# Patient Record
Sex: FEMALE | Race: BLACK OR AFRICAN AMERICAN | Employment: FULL TIME | URBAN - METROPOLITAN AREA
[De-identification: names, ages, dates, MRNs, and addresses within clinical notes are randomized per-mention and may not be internally consistent; named-entity substitution may affect disease eponyms.]

---

## 2017-04-20 ENCOUNTER — OFFICE VISIT (OUTPATIENT)
Dept: INTERNAL MEDICINE | Age: 57
End: 2017-04-20
Payer: COMMERCIAL

## 2017-04-20 VITALS
HEIGHT: 66 IN | WEIGHT: 141.8 LBS | SYSTOLIC BLOOD PRESSURE: 124 MMHG | HEART RATE: 64 BPM | BODY MASS INDEX: 22.79 KG/M2 | DIASTOLIC BLOOD PRESSURE: 77 MMHG

## 2017-04-20 DIAGNOSIS — E78.5 DYSLIPIDEMIA: ICD-10-CM

## 2017-04-20 DIAGNOSIS — E11.8 TYPE 2 DIABETES MELLITUS WITH COMPLICATION, UNSPECIFIED LONG TERM INSULIN USE STATUS: ICD-10-CM

## 2017-04-20 LAB — HBA1C MFR BLD: 6.9 %

## 2017-04-20 PROCEDURE — 99213 OFFICE O/P EST LOW 20 MIN: CPT | Performed by: STUDENT IN AN ORGANIZED HEALTH CARE EDUCATION/TRAINING PROGRAM

## 2017-04-20 PROCEDURE — 83036 HEMOGLOBIN GLYCOSYLATED A1C: CPT | Performed by: STUDENT IN AN ORGANIZED HEALTH CARE EDUCATION/TRAINING PROGRAM

## 2017-04-20 RX ORDER — PRAVASTATIN SODIUM 20 MG
TABLET ORAL
Qty: 30 TABLET | Refills: 3 | Status: SHIPPED | OUTPATIENT
Start: 2017-04-20 | End: 2017-08-28 | Stop reason: SDUPTHER

## 2017-04-20 RX ORDER — GLIMEPIRIDE 2 MG/1
TABLET ORAL
Qty: 30 TABLET | Refills: 3 | Status: SHIPPED | OUTPATIENT
Start: 2017-04-20 | End: 2017-08-28 | Stop reason: SDUPTHER

## 2017-04-20 RX ORDER — LISINOPRIL 2.5 MG/1
TABLET ORAL
Qty: 30 TABLET | Refills: 3 | Status: SHIPPED | OUTPATIENT
Start: 2017-04-20 | End: 2017-08-28 | Stop reason: SDUPTHER

## 2017-04-20 ASSESSMENT — PATIENT HEALTH QUESTIONNAIRE - PHQ9
7. TROUBLE CONCENTRATING ON THINGS, SUCH AS READING THE NEWSPAPER OR WATCHING TELEVISION: 0
5. POOR APPETITE OR OVEREATING: 0
2. FEELING DOWN, DEPRESSED OR HOPELESS: 0
3. TROUBLE FALLING OR STAYING ASLEEP: 0
SUM OF ALL RESPONSES TO PHQ9 QUESTIONS 1 & 2: 0
4. FEELING TIRED OR HAVING LITTLE ENERGY: 0
9. THOUGHTS THAT YOU WOULD BE BETTER OFF DEAD, OR OF HURTING YOURSELF: 0
10. IF YOU CHECKED OFF ANY PROBLEMS, HOW DIFFICULT HAVE THESE PROBLEMS MADE IT FOR YOU TO DO YOUR WORK, TAKE CARE OF THINGS AT HOME, OR GET ALONG WITH OTHER PEOPLE: 0
8. MOVING OR SPEAKING SO SLOWLY THAT OTHER PEOPLE COULD HAVE NOTICED. OR THE OPPOSITE, BEING SO FIGETY OR RESTLESS THAT YOU HAVE BEEN MOVING AROUND A LOT MORE THAN USUAL: 0
1. LITTLE INTEREST OR PLEASURE IN DOING THINGS: 0
6. FEELING BAD ABOUT YOURSELF - OR THAT YOU ARE A FAILURE OR HAVE LET YOURSELF OR YOUR FAMILY DOWN: 0
SUM OF ALL RESPONSES TO PHQ QUESTIONS 1-9: 0

## 2017-06-14 ENCOUNTER — TELEPHONE (OUTPATIENT)
Dept: INTERNAL MEDICINE | Age: 57
End: 2017-06-14

## 2017-06-14 RX ORDER — CEPHALEXIN 500 MG/1
500 CAPSULE ORAL 2 TIMES DAILY
Qty: 14 CAPSULE | Refills: 0 | Status: SHIPPED | OUTPATIENT
Start: 2017-06-14 | End: 2017-06-21

## 2017-08-28 DIAGNOSIS — E78.5 DYSLIPIDEMIA: ICD-10-CM

## 2017-08-28 DIAGNOSIS — E11.8 TYPE 2 DIABETES MELLITUS WITH COMPLICATION, UNSPECIFIED LONG TERM INSULIN USE STATUS: ICD-10-CM

## 2017-08-28 RX ORDER — GLIMEPIRIDE 2 MG/1
TABLET ORAL
Qty: 30 TABLET | Refills: 3 | Status: SHIPPED | OUTPATIENT
Start: 2017-08-28 | End: 2018-02-18 | Stop reason: SDUPTHER

## 2017-08-28 RX ORDER — LISINOPRIL 2.5 MG/1
TABLET ORAL
Qty: 30 TABLET | Refills: 3 | Status: SHIPPED | OUTPATIENT
Start: 2017-08-28 | End: 2018-02-18 | Stop reason: SDUPTHER

## 2017-08-28 RX ORDER — PRAVASTATIN SODIUM 20 MG
TABLET ORAL
Qty: 30 TABLET | Refills: 3 | Status: SHIPPED | OUTPATIENT
Start: 2017-08-28 | End: 2018-02-18 | Stop reason: SDUPTHER

## 2017-11-15 ENCOUNTER — TELEPHONE (OUTPATIENT)
Dept: INTERNAL MEDICINE | Age: 57
End: 2017-11-15

## 2018-02-12 ENCOUNTER — HOSPITAL ENCOUNTER (OUTPATIENT)
Age: 58
Setting detail: SPECIMEN
Discharge: HOME | End: 2018-02-12
Payer: COMMERCIAL

## 2018-02-12 LAB
ANION GAP SERPL CALCULATED.3IONS-SCNC: 11 MMOL/L (ref 9–17)
BUN BLDV-MCNC: 14 MG/DL (ref 6–20)
BUN/CREAT BLD: ABNORMAL (ref 9–20)
CALCIUM SERPL-MCNC: 9.1 MG/DL (ref 8.6–10.4)
CHLORIDE BLD-SCNC: 106 MMOL/L (ref 98–107)
CHOLESTEROL/HDL RATIO: 2.7
CHOLESTEROL: 189 MG/DL
CO2: 25 MMOL/L (ref 20–31)
CREAT SERPL-MCNC: 0.42 MG/DL (ref 0.5–0.9)
CREATININE URINE: 111.8 MG/DL (ref 28–217)
GFR AFRICAN AMERICAN: >60 ML/MIN
GFR NON-AFRICAN AMERICAN: >60 ML/MIN
GFR SERPL CREATININE-BSD FRML MDRD: ABNORMAL ML/MIN/{1.73_M2}
GFR SERPL CREATININE-BSD FRML MDRD: ABNORMAL ML/MIN/{1.73_M2}
GLUCOSE BLD-MCNC: 140 MG/DL (ref 70–99)
HDLC SERPL-MCNC: 70 MG/DL
LDL CHOLESTEROL: 105 MG/DL (ref 0–130)
MICROALBUMIN/CREAT 24H UR: <12 MG/L
MICROALBUMIN/CREAT UR-RTO: NORMAL MCG/MG CREAT
POTASSIUM SERPL-SCNC: 4.1 MMOL/L (ref 3.7–5.3)
SODIUM BLD-SCNC: 142 MMOL/L (ref 135–144)
TRIGL SERPL-MCNC: 68 MG/DL
VLDLC SERPL CALC-MCNC: NORMAL MG/DL (ref 1–30)

## 2018-02-12 PROCEDURE — 80061 LIPID PANEL: CPT

## 2018-02-12 PROCEDURE — 80048 BASIC METABOLIC PNL TOTAL CA: CPT

## 2018-02-12 PROCEDURE — 36415 COLL VENOUS BLD VENIPUNCTURE: CPT

## 2018-02-12 PROCEDURE — 82570 ASSAY OF URINE CREATININE: CPT

## 2018-02-12 PROCEDURE — 82043 UR ALBUMIN QUANTITATIVE: CPT

## 2018-02-18 DIAGNOSIS — E11.8 TYPE 2 DIABETES MELLITUS WITH COMPLICATION, UNSPECIFIED LONG TERM INSULIN USE STATUS: ICD-10-CM

## 2018-02-18 DIAGNOSIS — E78.5 DYSLIPIDEMIA: ICD-10-CM

## 2018-02-19 RX ORDER — PRAVASTATIN SODIUM 20 MG
TABLET ORAL
Qty: 30 TABLET | Refills: 0 | Status: SHIPPED | OUTPATIENT
Start: 2018-02-19 | End: 2018-03-01 | Stop reason: SDUPTHER

## 2018-02-19 RX ORDER — GLIMEPIRIDE 2 MG/1
TABLET ORAL
Qty: 30 TABLET | Refills: 0 | Status: SHIPPED | OUTPATIENT
Start: 2018-02-19 | End: 2018-03-01 | Stop reason: SDUPTHER

## 2018-02-19 RX ORDER — LISINOPRIL 2.5 MG/1
TABLET ORAL
Qty: 30 TABLET | Refills: 0 | Status: SHIPPED | OUTPATIENT
Start: 2018-02-19 | End: 2018-03-01 | Stop reason: SDUPTHER

## 2018-03-01 DIAGNOSIS — E11.8 TYPE 2 DIABETES MELLITUS WITH COMPLICATION, UNSPECIFIED LONG TERM INSULIN USE STATUS: ICD-10-CM

## 2018-03-01 DIAGNOSIS — E78.5 DYSLIPIDEMIA: ICD-10-CM

## 2018-03-01 RX ORDER — LISINOPRIL 2.5 MG/1
2.5 TABLET ORAL DAILY
Qty: 30 TABLET | Refills: 0 | Status: SHIPPED | OUTPATIENT
Start: 2018-03-01 | End: 2018-03-07 | Stop reason: SDUPTHER

## 2018-03-01 RX ORDER — PRAVASTATIN SODIUM 20 MG
20 TABLET ORAL DAILY
Qty: 30 TABLET | Refills: 0 | Status: SHIPPED | OUTPATIENT
Start: 2018-03-01 | End: 2018-03-07 | Stop reason: SDUPTHER

## 2018-03-01 RX ORDER — GLIMEPIRIDE 2 MG/1
2 TABLET ORAL
Qty: 30 TABLET | Refills: 0 | Status: SHIPPED | OUTPATIENT
Start: 2018-03-01 | End: 2018-03-07 | Stop reason: SDUPTHER

## 2018-03-07 ENCOUNTER — OFFICE VISIT (OUTPATIENT)
Dept: INTERNAL MEDICINE | Age: 58
End: 2018-03-07
Payer: COMMERCIAL

## 2018-03-07 VITALS
WEIGHT: 149 LBS | HEART RATE: 76 BPM | HEIGHT: 66 IN | DIASTOLIC BLOOD PRESSURE: 70 MMHG | SYSTOLIC BLOOD PRESSURE: 116 MMHG | BODY MASS INDEX: 23.95 KG/M2

## 2018-03-07 DIAGNOSIS — E11.9 TYPE 2 DIABETES MELLITUS WITHOUT COMPLICATION, WITHOUT LONG-TERM CURRENT USE OF INSULIN (HCC): Primary | ICD-10-CM

## 2018-03-07 DIAGNOSIS — Z00.00 HEALTH CARE MAINTENANCE: ICD-10-CM

## 2018-03-07 DIAGNOSIS — E78.5 DYSLIPIDEMIA: ICD-10-CM

## 2018-03-07 DIAGNOSIS — Z12.31 ENCOUNTER FOR SCREENING MAMMOGRAM FOR BREAST CANCER: ICD-10-CM

## 2018-03-07 DIAGNOSIS — Z23 NEED FOR PROPHYLACTIC VACCINATION AND INOCULATION AGAINST VARICELLA: ICD-10-CM

## 2018-03-07 DIAGNOSIS — E11.8 TYPE 2 DIABETES MELLITUS WITH COMPLICATION, UNSPECIFIED LONG TERM INSULIN USE STATUS: ICD-10-CM

## 2018-03-07 LAB — HBA1C MFR BLD: 7.6 %

## 2018-03-07 PROCEDURE — 99213 OFFICE O/P EST LOW 20 MIN: CPT | Performed by: INTERNAL MEDICINE

## 2018-03-07 PROCEDURE — 83036 HEMOGLOBIN GLYCOSYLATED A1C: CPT | Performed by: INTERNAL MEDICINE

## 2018-03-07 RX ORDER — LISINOPRIL 2.5 MG/1
2.5 TABLET ORAL DAILY
Qty: 90 TABLET | Refills: 1 | Status: SHIPPED | OUTPATIENT
Start: 2018-03-07 | End: 2018-07-25 | Stop reason: SDUPTHER

## 2018-03-07 RX ORDER — PRAVASTATIN SODIUM 20 MG
20 TABLET ORAL DAILY
Qty: 90 TABLET | Refills: 1 | Status: SHIPPED | OUTPATIENT
Start: 2018-03-07 | End: 2018-07-25 | Stop reason: SDUPTHER

## 2018-03-07 RX ORDER — GLIMEPIRIDE 2 MG/1
2 TABLET ORAL
Qty: 90 TABLET | Refills: 1 | Status: SHIPPED | OUTPATIENT
Start: 2018-03-07 | End: 2018-07-25 | Stop reason: SDUPTHER

## 2018-03-07 NOTE — PROGRESS NOTES
speech-recognition program. While intending to generate a document that actually reflects the content of the visit, the document can still have some mistakes which may not have been identified and corrected by editing.

## 2018-03-09 ENCOUNTER — OFFICE VISIT (OUTPATIENT)
Dept: INTERNAL MEDICINE | Age: 58
End: 2018-03-09
Payer: COMMERCIAL

## 2018-03-09 VITALS
DIASTOLIC BLOOD PRESSURE: 71 MMHG | HEART RATE: 76 BPM | WEIGHT: 151 LBS | BODY MASS INDEX: 24.37 KG/M2 | SYSTOLIC BLOOD PRESSURE: 121 MMHG

## 2018-03-09 DIAGNOSIS — M65.341 TRIGGER RING FINGER OF RIGHT HAND: Primary | ICD-10-CM

## 2018-03-09 PROCEDURE — 99213 OFFICE O/P EST LOW 20 MIN: CPT

## 2018-03-09 PROCEDURE — 99213 OFFICE O/P EST LOW 20 MIN: CPT | Performed by: INTERNAL MEDICINE

## 2018-03-09 RX ORDER — ACETAMINOPHEN 500 MG
1000 TABLET ORAL 3 TIMES DAILY PRN
Qty: 60 TABLET | Refills: 0 | Status: SHIPPED | OUTPATIENT
Start: 2018-03-09

## 2018-03-09 NOTE — PROGRESS NOTES
Visit Information    Have you changed or started any medications since your last visit including any over-the-counter medicines, vitamins, or herbal medicines? no   Have you stopped taking any of your medications? Is so, why? -  no  Are you having any side effects from any of your medications? - no    Have you seen any other physician or provider since your last visit?  no   Have you had any other diagnostic tests since your last visit?  no   Have you been seen in the emergency room and/or had an admission in a hospital since we last saw you?  no   Have you had your routine dental cleaning in the past 6 months?  no     Do you have an active MyChart account? If no, what is the barrier?   Yes    Patient Care Team:  Rashawn Escobar MD as PCP - General (Internal Medicine)    Medical History Review  Past Medical, Family, and Social History reviewed and does not contribute to the patient presenting condition    Health Maintenance   Topic Date Due    Hepatitis C screen  1960    HIV screen  07/19/1975    Shingles Vaccine (1 of 2 - 2 Dose Series) 07/19/2010    Diabetic retinal exam  11/01/2016    Flu vaccine (1) 09/01/2017    Breast cancer screen  02/04/2018    Diabetic microalbuminuria test  02/12/2019    Lipid screen  02/12/2019    Potassium monitoring  02/12/2019    Creatinine monitoring  02/12/2019    Diabetic foot exam  03/07/2019    A1C test (Diabetic or Prediabetic)  03/07/2019    DTaP/Tdap/Td vaccine (2 - Td) 02/01/2026    Colon cancer screen colonoscopy  07/05/2026    Pneumococcal med risk  Completed

## 2018-03-09 NOTE — PROGRESS NOTES
[facility]    Progress Note    Date of patient's visit: 3/9/2018  YOB: 1960  Patient's Name:  Guru Rizo    Patient Care Team:  Vasiliy Isaac MD as PCP - General (Internal Medicine)    REASON FOR VISIT: Routine outpatient follow     HISTORY OF PRESENT ILLNESS:    History was obtained from the patient. Guru Rizo is a 62 y.o. is here for a  Sick visit for right ring finger pain and contracture. Patient states that over the past several days she's been doing repetitive lifting of various boxes. She states that the finger on her right hand/ring finger developed pain in the PIP and MCP joints but spared the DIP joint. Patient has no other pain or arthralgia and hands knees elbows hips. Patient states that after trying to flex the finger she was unable to really extend the finger. She states that she has decrease in  strength.     Patient Active Problem List   Diagnosis    Dyslipidemia    Tension headache    Intraocular pressure increase    Retinal detachment of right eye with single break    Type 2 diabetes mellitus without complication, without long-term current use of insulin ([facility])       ALLERGIES      Allergies   Allergen Reactions    Aspirin Hives    Ibuprofen          MEDICATIONS:      Current Outpatient Prescriptions on File Prior to Visit   Medication Sig Dispense Refill    SITagliptin (JANUVIA) 100 MG tablet Take 1 tablet by mouth every morning (before breakfast) 30 tablet 3    lisinopril (PRINIVIL;ZESTRIL) 2.5 MG tablet Take 1 tablet by mouth daily 90 tablet 1    pravastatin (PRAVACHOL) 20 MG tablet Take 1 tablet by mouth daily 90 tablet 1    glimepiride (AMARYL) 2 MG tablet Take 1 tablet by mouth every morning (before breakfast) 90 tablet 1    metFORMIN (GLUCOPHAGE) 500 MG tablet Take 1 tablet by mouth 2 times daily (with meals) 180 tablet 1    linagliptin (TRADJENTA) 5 MG tablet Take 1 tablet by mouth daily 30 tablet 5  brimonidine (ALPHAGAN P) 0.15 % ophthalmic solution Place 1 drop into the right eye 2 times daily  1    timolol (TIMOPTIC) 0.5 % ophthalmic solution Place 1 drop into both eyes 2 times daily  1    bimatoprost (LUMIGAN) 0.01 % SOLN ophthalmic drops Place 1 drop into the right eye daily       No current facility-administered medications on file prior to visit. SOCIAL HISTORY    Reviewed and no change from previous record. Sandee Lubin  reports that she has never smoked. She has never used smokeless tobacco.    FAMILY HISTORY:    Reviewed and No change from previous visit    REVIEW OF SYSTEMS:    ENT: negative  Respiratory: no cough, shortness of breath, or wheezing  Cardiovascular: no chest pain or dyspnea on exertion  Gastrointestinal: no abdominal pain, black or bloody stools  Neurological: no TIA or stroke symptoms  Endocrine: negative  Genito-Urinary: no dysuria, trouble voiding, or hematuria  Musculoskeletal: Right ring finger  Dermatological: negative    PHYSICAL EXAM:      Vitals:    03/09/18 0826   BP: 121/71   Pulse: 76     Chest - clear to auscultation, no wheezes, rales or rhonchi, symmetric air entry  Heart - normal rate, regular rhythm, normal S1, S2, no murmurs, rubs, clicks or gallops  Abdomen - soft, nontender, nondistended, no masses or organomegaly  Neurological - alert, oriented, normal speech, no focal findings or movement disorder noted  Musculoskeletal - 5 out of 5 strength in  strength of right hand. There is no cord formation. No evidence of erythema gross sores or drainage.  Cap Refill was less than 2 seconds  Extremities - peripheral pulses normal, no pedal edema, no clubbing or cyanosis    LABORATORY FINDINGS:    CBC:  Lab Results   Component Value Date    WBC 3.3 06/11/2015    HGB 12.9 06/11/2015     06/11/2015     BMP:    Lab Results   Component Value Date     02/12/2018    K 4.1 02/12/2018     02/12/2018    CO2 25 02/12/2018    BUN 14 02/12/2018    CREATININE

## 2018-03-12 ENCOUNTER — PATIENT MESSAGE (OUTPATIENT)
Dept: INTERNAL MEDICINE | Age: 58
End: 2018-03-12

## 2018-04-04 ENCOUNTER — HOSPITAL ENCOUNTER (OUTPATIENT)
Dept: WOMENS IMAGING | Age: 58
Discharge: HOME | End: 2018-04-06
Payer: COMMERCIAL

## 2018-04-04 DIAGNOSIS — Z12.31 ENCOUNTER FOR SCREENING MAMMOGRAM FOR BREAST CANCER: ICD-10-CM

## 2018-04-04 PROCEDURE — 77063 BREAST TOMOSYNTHESIS BI: CPT

## 2018-05-04 ENCOUNTER — HOSPITAL ENCOUNTER (OUTPATIENT)
Age: 58
Setting detail: SPECIMEN
Discharge: HOME | End: 2018-05-04
Payer: COMMERCIAL

## 2018-05-04 ENCOUNTER — OFFICE VISIT (OUTPATIENT)
Dept: INTERNAL MEDICINE | Age: 58
End: 2018-05-04
Payer: COMMERCIAL

## 2018-05-04 VITALS
HEIGHT: 66 IN | BODY MASS INDEX: 24.75 KG/M2 | WEIGHT: 154 LBS | SYSTOLIC BLOOD PRESSURE: 96 MMHG | DIASTOLIC BLOOD PRESSURE: 67 MMHG | HEART RATE: 74 BPM

## 2018-05-04 DIAGNOSIS — M65.341 TRIGGER RING FINGER OF RIGHT HAND: Primary | ICD-10-CM

## 2018-05-04 DIAGNOSIS — Z00.00 HEALTH CARE MAINTENANCE: ICD-10-CM

## 2018-05-04 LAB
HEPATITIS C ANTIBODY: NONREACTIVE
HIV AG/AB: NONREACTIVE

## 2018-05-04 PROCEDURE — 87389 HIV-1 AG W/HIV-1&-2 AB AG IA: CPT

## 2018-05-04 PROCEDURE — 36415 COLL VENOUS BLD VENIPUNCTURE: CPT

## 2018-05-04 PROCEDURE — 86803 HEPATITIS C AB TEST: CPT

## 2018-05-04 PROCEDURE — 99212 OFFICE O/P EST SF 10 MIN: CPT | Performed by: INTERNAL MEDICINE

## 2018-05-04 ASSESSMENT — PATIENT HEALTH QUESTIONNAIRE - PHQ9
SUM OF ALL RESPONSES TO PHQ QUESTIONS 1-9: 0
1. LITTLE INTEREST OR PLEASURE IN DOING THINGS: 0
SUM OF ALL RESPONSES TO PHQ9 QUESTIONS 1 & 2: 0
2. FEELING DOWN, DEPRESSED OR HOPELESS: 0

## 2018-05-16 ENCOUNTER — OFFICE VISIT (OUTPATIENT)
Dept: ORTHOPEDIC SURGERY | Age: 58
End: 2018-05-16
Payer: COMMERCIAL

## 2018-05-16 VITALS — HEIGHT: 66 IN | BODY MASS INDEX: 24.45 KG/M2 | WEIGHT: 152.12 LBS

## 2018-05-16 DIAGNOSIS — M65.341 TRIGGER FINGER, RIGHT RING FINGER: Primary | ICD-10-CM

## 2018-05-16 PROCEDURE — 99243 OFF/OP CNSLTJ NEW/EST LOW 30: CPT | Performed by: ORTHOPAEDIC SURGERY

## 2018-05-16 PROCEDURE — 20550 NJX 1 TENDON SHEATH/LIGAMENT: CPT | Performed by: ORTHOPAEDIC SURGERY

## 2018-05-16 RX ORDER — BETAMETHASONE SODIUM PHOSPHATE AND BETAMETHASONE ACETATE 3; 3 MG/ML; MG/ML
3 INJECTION, SUSPENSION INTRA-ARTICULAR; INTRALESIONAL; INTRAMUSCULAR; SOFT TISSUE ONCE
Status: COMPLETED | OUTPATIENT
Start: 2018-05-16 | End: 2018-05-16

## 2018-05-16 RX ORDER — LIDOCAINE HYDROCHLORIDE 10 MG/ML
0.5 INJECTION, SOLUTION INFILTRATION; PERINEURAL ONCE
Status: COMPLETED | OUTPATIENT
Start: 2018-05-16 | End: 2018-05-16

## 2018-05-16 RX ADMIN — BETAMETHASONE SODIUM PHOSPHATE AND BETAMETHASONE ACETATE 3 MG: 3; 3 INJECTION, SUSPENSION INTRA-ARTICULAR; INTRALESIONAL; INTRAMUSCULAR; SOFT TISSUE at 13:22

## 2018-05-16 RX ADMIN — LIDOCAINE HYDROCHLORIDE 0.5 ML: 10 INJECTION, SOLUTION INFILTRATION; PERINEURAL at 13:22

## 2018-05-16 ASSESSMENT — ENCOUNTER SYMPTOMS
DIARRHEA: 0
COUGH: 0
CONSTIPATION: 0
NAUSEA: 0

## 2018-06-28 ENCOUNTER — TELEPHONE (OUTPATIENT)
Dept: INTERNAL MEDICINE | Age: 58
End: 2018-06-28

## 2018-06-28 DIAGNOSIS — N39.0 URINARY TRACT INFECTION WITHOUT HEMATURIA, SITE UNSPECIFIED: Primary | ICD-10-CM

## 2018-06-29 RX ORDER — CEPHALEXIN 500 MG/1
500 CAPSULE ORAL 2 TIMES DAILY
Qty: 14 CAPSULE | Refills: 0 | Status: SHIPPED | OUTPATIENT
Start: 2018-06-29 | End: 2018-07-06

## 2018-07-05 RX ORDER — SITAGLIPTIN 100 MG/1
TABLET, FILM COATED ORAL
Qty: 30 TABLET | Refills: 3 | Status: SHIPPED | OUTPATIENT
Start: 2018-07-05 | End: 2018-07-25 | Stop reason: SDUPTHER

## 2018-07-25 ENCOUNTER — HOSPITAL ENCOUNTER (OUTPATIENT)
Age: 58
Setting detail: SPECIMEN
Discharge: HOME | End: 2018-07-25
Payer: COMMERCIAL

## 2018-07-25 ENCOUNTER — OFFICE VISIT (OUTPATIENT)
Dept: INTERNAL MEDICINE | Age: 58
End: 2018-07-25
Payer: COMMERCIAL

## 2018-07-25 VITALS
DIASTOLIC BLOOD PRESSURE: 72 MMHG | WEIGHT: 154 LBS | HEART RATE: 72 BPM | SYSTOLIC BLOOD PRESSURE: 106 MMHG | BODY MASS INDEX: 25.66 KG/M2 | HEIGHT: 65 IN

## 2018-07-25 DIAGNOSIS — E11.9 TYPE 2 DIABETES MELLITUS WITHOUT COMPLICATION, WITHOUT LONG-TERM CURRENT USE OF INSULIN (HCC): Primary | ICD-10-CM

## 2018-07-25 DIAGNOSIS — E11.9 TYPE 2 DIABETES MELLITUS WITHOUT COMPLICATION, WITHOUT LONG-TERM CURRENT USE OF INSULIN (HCC): ICD-10-CM

## 2018-07-25 DIAGNOSIS — E11.8 TYPE 2 DIABETES MELLITUS WITH COMPLICATION, UNSPECIFIED LONG TERM INSULIN USE STATUS: ICD-10-CM

## 2018-07-25 DIAGNOSIS — I10 ESSENTIAL HYPERTENSION: ICD-10-CM

## 2018-07-25 DIAGNOSIS — E78.5 DYSLIPIDEMIA: ICD-10-CM

## 2018-07-25 LAB
ESTIMATED AVERAGE GLUCOSE: 131 MG/DL
HBA1C MFR BLD: 6.2 % (ref 4–6)

## 2018-07-25 PROCEDURE — 83036 HEMOGLOBIN GLYCOSYLATED A1C: CPT

## 2018-07-25 PROCEDURE — 99211 OFF/OP EST MAY X REQ PHY/QHP: CPT | Performed by: INTERNAL MEDICINE

## 2018-07-25 PROCEDURE — 99214 OFFICE O/P EST MOD 30 MIN: CPT | Performed by: INTERNAL MEDICINE

## 2018-07-25 PROCEDURE — 36415 COLL VENOUS BLD VENIPUNCTURE: CPT

## 2018-07-25 RX ORDER — LISINOPRIL 2.5 MG/1
2.5 TABLET ORAL DAILY
Qty: 90 TABLET | Refills: 1 | Status: SHIPPED | OUTPATIENT
Start: 2018-07-25

## 2018-07-25 RX ORDER — PRAVASTATIN SODIUM 20 MG
20 TABLET ORAL DAILY
Qty: 90 TABLET | Refills: 1 | Status: SHIPPED | OUTPATIENT
Start: 2018-07-25

## 2018-07-25 RX ORDER — GLIMEPIRIDE 2 MG/1
2 TABLET ORAL
Qty: 90 TABLET | Refills: 1 | Status: SHIPPED | OUTPATIENT
Start: 2018-07-25

## 2018-07-25 NOTE — PROGRESS NOTES
Chronic Disease Visit Information    BP Readings from Last 3 Encounters:   05/04/18 96/67   03/09/18 121/71   03/07/18 116/70          Hemoglobin A1C (%)   Date Value   03/07/2018 7.6   04/20/2017 6.9   02/01/2016 7.3     Microalb/Crt. Ratio (mcg/mg creat)   Date Value   02/12/2018 CANNOT BE CALCULATED     LDL Cholesterol (mg/dL)   Date Value   02/12/2018 105     HDL (mg/dL)   Date Value   02/12/2018 70     BUN (mg/dL)   Date Value   02/12/2018 14     CREATININE (mg/dL)   Date Value   02/12/2018 0.42 (L)     Glucose (mg/dL)   Date Value   02/12/2018 140 (H)   04/12/2012 132 (H)            Have you changed or started any medications since your last visit including any over-the-counter medicines, vitamins, or herbal medicines? no   Are you having any side effects from any of your medications? -  no  Have you stopped taking any of your medications? Is so, why? -  no    Have you seen any other physician or provider since your last visit? No  Have you had any other diagnostic tests since your last visit? No  Have you been seen in the emergency room and/or had an admission to a hospital since we last saw you? No  Have you had your annual diabetic retinal (eye) exam? No  Have you had your routine dental cleaning in the past 6 months? no    Have you activated your [employer] account? If not, what are your barriers?  Yes     Patient Care Team:  Lillie Guadalupe MD as PCP - General (Internal Medicine)         Medical History Review  Past Medical, Family, and Social History reviewed and does not contribute to the patient presenting condition    Health Maintenance   Topic Date Due    Diabetic retinal exam  11/01/2016    Flu vaccine (1) 09/01/2018    Shingles Vaccine (2 of 2 - 2 Dose Series) 09/09/2018    Diabetic microalbuminuria test  02/12/2019    Lipid screen  02/12/2019    Potassium monitoring  02/12/2019    Creatinine monitoring  02/12/2019    Diabetic foot exam  03/07/2019    A1C test (Diabetic or Prediabetic)

## 2018-07-25 NOTE — PATIENT INSTRUCTIONS
Medications e-scribed to pharmacy of patient's choice. LABORATORY INSTRUCTIONS    Your doctor has ordered blood or urine testing. You can get this testing done at the Lab located on the first floor of the [facility], or at any other [facility]. Please stop at Main Registration, before going to the lab, as you must be registered first.     Please get this lab done before your next visit. You may eat or drink before this test.    Return To Clinic in about 3 months, around 10/25/18, patient will call to schedule around 9/25/18. After Visit Summary mailed to the patient along with appointment card and all other paperwork. JF    It is very important for your care that you keep your appointment. If for some reason you are unable to keep your appointment it is equally important that you call our office at [phone] to cancel your appointment and reschedule. Failure to do so may result in your termination from our practice.

## 2018-07-25 NOTE — PROGRESS NOTES
[facility] PHYSICIANS  [facility] [address] [facility]  [address] [facility] [address] [facility]  Dept: [phone]      Today's Date: 7/25/2018  Patient Name: Jamie James  Patient's age: 62 y.o., 1960        CHIEF COMPLAINT:    Chief Complaint   Patient presents with    Diabetes     3 month follow up   [address] Exam due       History Obtained From:  patient    HISTORY OF PRESENT ILLNESS:      The patient is a 62 y.o. old  female and is here to Follow-up for hypertension and diabetes. Her blood pressure is well controlled with lisinopril 2.5 mg daily. For diabetes she is on metformin 500 mg daily, Januvia 100 mg daily, glimepiride 2 mg daily. She is not able to tolerate metformin will then once a day. She states her blood sugar has been running in low 100s to sometimes and 90s or 80s. Denies any hypoglycemic events. Patient Active Problem List   Diagnosis    Dyslipidemia    Tension headache    Intraocular pressure increase    Retinal detachment of right eye with single break    Type 2 diabetes mellitus without complication, without long-term current use of insulin ([facility])       Past Medical History:   has a past medical history of Diabetic neuropathy ([address] Fibroid (bleeding) (uterine); Glaucoma; Headache(784.0); History of anemia; Hx of blood clots; Hyperlipidemia; Increased intraocular pressure; Retinal detachment; and Type II or unspecified type diabetes mellitus without mention of complication, not stated as uncontrolled. Past Surgical History:   has a past surgical history that includes Wrist ganglion excision (Left, 1968); Hysterectomy, total abdominal; Colonoscopy; Eye surgery (Right); eye surgery (Right, 08/11/2016); and Cataract removal with implant (Right, 12/06/2016).      Medications:    Current Outpatient Prescriptions   Medication Sig Dispense Refill    JANUVIA 100 MG tablet take 1 tablet by mouth every morning BEFORE BREAKFAST 30 tablet 3    acetaminophen (APAP EXTRA STRENGTH) 500 MG tablet Take 2 tablets by mouth 3 times daily as needed for Pain 60 tablet 0    Elastic Bandages & Supports (FINGER SPLINT) MISC Dx: finger pain with contracture 1 each 1    lisinopril (PRINIVIL;ZESTRIL) 2.5 MG tablet Take 1 tablet by mouth daily 90 tablet 1    pravastatin (PRAVACHOL) 20 MG tablet Take 1 tablet by mouth daily 90 tablet 1    glimepiride (AMARYL) 2 MG tablet Take 1 tablet by mouth every morning (before breakfast) 90 tablet 1    metFORMIN (GLUCOPHAGE) 500 MG tablet Take 1 tablet by mouth 2 times daily (with meals) 180 tablet 1    brimonidine (ALPHAGAN P) 0.15 % ophthalmic solution Place 1 drop into the right eye 2 times daily  1    timolol (TIMOPTIC) 0.5 % ophthalmic solution Place 1 drop into both eyes 2 times daily  1    bimatoprost (LUMIGAN) 0.01 % SOLN ophthalmic drops Place 1 drop into the right eye daily       No current facility-administered medications for this visit. Allergies:  Aspirin and Ibuprofen    Social History:   reports that she has never smoked. She has never used smokeless tobacco. She reports that she does not drink alcohol or use drugs. Family History: family history includes Brain Cancer in her maternal uncle; Breast Cancer in her maternal aunt; Colon Cancer in her maternal aunt; Diabetes in her mother; High Blood Pressure in her brother and mother. REVIEW OF SYSTEMS:      Constitutional: Negative for fever and fatigue. HENT: Negative for congestion and sore throat. Eyes: Negative for eye pain and visual disturbance. Respiratory: Negative for chest tightness and shortness of breath. Cardiovascular: Negative for chest pain and orthopnea . Gastrointestinal: Negative for vomiting, abdominal pain, constipation and diarrhea. Endocrine: Negative for cold intolerance, heat intolerance, polydipsia and polyuria. Genitourinary: Negative for dysuria and frequency.    Musculoskeletal: Negative for Myalgia, back pain, bone pain and arthralgias. Neurological: Negative for dizziness, weakness and headaches. PHYSICAL EXAM:        /72 (Site: Left Arm, Position: Sitting, Cuff Size: Medium Adult)   Pulse 72   Ht 5' 5\" (1.651 m)   Wt 154 lb (69.9 kg)   BMI 25.63 kg/m²      General appearance - well appearing, not in  distress. Mental status - alert and cooperative . Head: Normocephalic, without obvious abnormality, atraumatic. Eye: PERRL, conjunctiva/corneas clear, EOM's intact. Neck - Supple, no significant adenopathy . Chest - Clear to auscultation, no wheezes, rales or rhonchi, symmetric air entry. Heart -  regular rhythm, normal S1, S2, no murmurs. Abdomen - Soft, nontender, nondistended, no masses or organomegaly. Neurological - Alert, oriented, normal speech, no focal findings or movement disorder noted. .   Extremities -  No pedal edema, no clubbing or cyanosis. Labs:       Chemistry        Component Value Date/Time     02/12/2018 0857    K 4.1 02/12/2018 0857     02/12/2018 0857    CO2 25 02/12/2018 0857    BUN 14 02/12/2018 0857    CREATININE 0.42 (L) 02/12/2018 0857        Component Value Date/Time    CALCIUM 9.1 02/12/2018 0857    ALKPHOS 103 06/11/2015 0857    AST 15 06/11/2015 0857    ALT 19 06/11/2015 0857    BILITOT 0.37 06/11/2015 0857          No results for input(s): GLUCOSE in the last 72 hours.   Lab Results   Component Value Date    WBC 3.3 (L) 06/11/2015    HGB 12.9 06/11/2015    HCT 39.0 06/11/2015    MCV 89.8 06/11/2015     06/11/2015     No results found for: TSH  Lab Results   Component Value Date    LABA1C 7.6 03/07/2018     Lab Results   Component Value Date    LABMICR CANNOT BE CALCULATED 02/12/2018     No components found for: CHLPL  Lab Results   Component Value Date    TRIG 68 02/12/2018     Lab Results   Component Value Date    HDL 70 02/12/2018     Lab Results   Component Value Date    LDLCHOLESTEROL 105 02/12/2018     The 10-year ASCVD risk score (Scar Pete et al., 2013) is: 4.5%    Values used to calculate the score:      Age: 62 years      Sex: Female      Is Non- : Yes      Diabetic: Yes      Tobacco smoker: No      Systolic Blood Pressure: 854 mmHg      Is BP treated: No      HDL Cholesterol: 70 mg/dL      Total Cholesterol: 189 mg/dL    Health Maintenance Due   Topic Date Due    Diabetic retinal exam  11/01/2016        ASSESSMENT AND PLAN    1. Type 2 diabetes mellitus without complication, without long-term current use of insulin (HCC)    - Hemoglobin A1C; Future  - SITagliptin (JANUVIA) 100 MG tablet; take 1 tablet by mouth every morning BEFORE BREAKFAST  Dispense: 90 tablet; Refill: 1  - metFORMIN (GLUCOPHAGE) 500 MG tablet; Take 1 tablet by mouth 2 times daily (with meals)  Dispense: 180 tablet; Refill: 1    2. Type 2 diabetes mellitus with complication, unspecified long term insulin use status (HCC)    - glimepiride (AMARYL) 2 MG tablet; Take 1 tablet by mouth every morning (before breakfast)  Dispense: 90 tablet; Refill: 1    3. Dyslipidemia    - pravastatin (PRAVACHOL) 20 MG tablet; Take 1 tablet by mouth daily  Dispense: 90 tablet; Refill: 1    4. Essential hypertension    - lisinopril (PRINIVIL;ZESTRIL) 2.5 MG tablet; Take 1 tablet by mouth daily  Dispense: 90 tablet; Refill: 1      · Continue current meds   · A1c   · Sydnee received counseling on the following healthy behaviors: exercise and medication adherence  · Discussed use, benefit, and side effects of prescribed medications. Barriers to medication compliance addressed. All patient questions answered. Pt voiced understanding.    · The return visit should be in 3 months      Angela Reynolds MD  Attending and Faculty Internal Medicine  [facility] [address] [facility]  [address] [facility] [address] [facility]  Dept: [phone]  7/25/18      This note is created with the assistance of a speech-recognition program. While intending to generate a document that actually reflects the content of the visit, the document can still have some mistakes which may not have been identified and corrected by editing.

## 2019-07-09 ENCOUNTER — PATIENT MESSAGE (OUTPATIENT)
Dept: OTHER | Facility: CLINIC | Age: 59
End: 2019-07-09

## 2023-03-07 ENCOUNTER — OFFICE VISIT (OUTPATIENT)
Dept: INTERNAL MEDICINE CLINIC | Age: 63
End: 2023-03-07

## 2023-03-07 VITALS
DIASTOLIC BLOOD PRESSURE: 78 MMHG | HEIGHT: 66 IN | BODY MASS INDEX: 24.3 KG/M2 | WEIGHT: 151.2 LBS | SYSTOLIC BLOOD PRESSURE: 118 MMHG

## 2023-03-07 DIAGNOSIS — Z00.00 WELL ADULT EXAM: ICD-10-CM

## 2023-03-07 DIAGNOSIS — E11.42 TYPE 2 DIABETES MELLITUS WITH DIABETIC POLYNEUROPATHY, WITHOUT LONG-TERM CURRENT USE OF INSULIN (HCC): Primary | ICD-10-CM

## 2023-03-07 DIAGNOSIS — E78.5 DYSLIPIDEMIA: ICD-10-CM

## 2023-03-07 DIAGNOSIS — I10 ESSENTIAL HYPERTENSION: ICD-10-CM

## 2023-03-07 DIAGNOSIS — Z13.220 SCREENING CHOLESTEROL LEVEL: ICD-10-CM

## 2023-03-07 DIAGNOSIS — E11.9 TYPE 2 DIABETES MELLITUS WITHOUT COMPLICATION, WITHOUT LONG-TERM CURRENT USE OF INSULIN (HCC): ICD-10-CM

## 2023-03-07 DIAGNOSIS — E11.8 TYPE 2 DIABETES MELLITUS WITH COMPLICATION (HCC): ICD-10-CM

## 2023-03-07 DIAGNOSIS — E78.00 HYPERCHOLESTEREMIA: ICD-10-CM

## 2023-03-07 RX ORDER — PRAVASTATIN SODIUM 20 MG
20 TABLET ORAL DAILY
Qty: 90 TABLET | Refills: 1 | Status: SHIPPED | OUTPATIENT
Start: 2023-03-07

## 2023-03-07 RX ORDER — GLUCOSAMINE HCL/CHONDROITIN SU 500-400 MG
CAPSULE ORAL
Qty: 100 STRIP | Refills: 5 | Status: SHIPPED | OUTPATIENT
Start: 2023-03-07

## 2023-03-07 RX ORDER — GLIMEPIRIDE 2 MG/1
2 TABLET ORAL
Qty: 90 TABLET | Refills: 1 | Status: SHIPPED | OUTPATIENT
Start: 2023-03-07

## 2023-03-07 RX ORDER — LISINOPRIL 2.5 MG/1
2.5 TABLET ORAL DAILY
Qty: 90 TABLET | Refills: 1 | Status: SHIPPED | OUTPATIENT
Start: 2023-03-07

## 2023-03-07 RX ORDER — LANCETS 30 GAUGE
EACH MISCELLANEOUS
Qty: 100 EACH | Refills: 3 | Status: SHIPPED | OUTPATIENT
Start: 2023-03-07

## 2023-03-07 RX ORDER — SYRING-NEEDL,DISP,INSUL,0.3 ML 30 GX5/16"
SYRINGE, EMPTY DISPOSABLE MISCELLANEOUS
Qty: 1 EACH | Refills: 0 | Status: SHIPPED | OUTPATIENT
Start: 2023-03-07

## 2023-03-07 RX ORDER — UBIQUINOL 100 MG
CAPSULE ORAL
Qty: 100 EACH | Refills: 3 | Status: SHIPPED | OUTPATIENT
Start: 2023-03-07

## 2023-03-07 RX ORDER — BLOOD-GLUCOSE METER
1 KIT MISCELLANEOUS DAILY
Qty: 1 KIT | Refills: 0 | Status: SHIPPED | OUTPATIENT
Start: 2023-03-07

## 2023-03-07 SDOH — ECONOMIC STABILITY: FOOD INSECURITY: WITHIN THE PAST 12 MONTHS, YOU WORRIED THAT YOUR FOOD WOULD RUN OUT BEFORE YOU GOT MONEY TO BUY MORE.: NEVER TRUE

## 2023-03-07 SDOH — ECONOMIC STABILITY: HOUSING INSECURITY
IN THE LAST 12 MONTHS, WAS THERE A TIME WHEN YOU DID NOT HAVE A STEADY PLACE TO SLEEP OR SLEPT IN A SHELTER (INCLUDING NOW)?: NO

## 2023-03-07 SDOH — ECONOMIC STABILITY: FOOD INSECURITY: WITHIN THE PAST 12 MONTHS, THE FOOD YOU BOUGHT JUST DIDN'T LAST AND YOU DIDN'T HAVE MONEY TO GET MORE.: NEVER TRUE

## 2023-03-07 SDOH — ECONOMIC STABILITY: INCOME INSECURITY: HOW HARD IS IT FOR YOU TO PAY FOR THE VERY BASICS LIKE FOOD, HOUSING, MEDICAL CARE, AND HEATING?: NOT HARD AT ALL

## 2023-03-07 SDOH — HEALTH STABILITY: PHYSICAL HEALTH: ON AVERAGE, HOW MANY DAYS PER WEEK DO YOU ENGAGE IN MODERATE TO STRENUOUS EXERCISE (LIKE A BRISK WALK)?: 6 DAYS

## 2023-03-07 SDOH — HEALTH STABILITY: PHYSICAL HEALTH: ON AVERAGE, HOW MANY MINUTES DO YOU ENGAGE IN EXERCISE AT THIS LEVEL?: 120 MIN

## 2023-03-07 ASSESSMENT — PATIENT HEALTH QUESTIONNAIRE - PHQ9
1. LITTLE INTEREST OR PLEASURE IN DOING THINGS: 0
SUM OF ALL RESPONSES TO PHQ QUESTIONS 1-9: 0
SUM OF ALL RESPONSES TO PHQ QUESTIONS 1-9: 0
2. FEELING DOWN, DEPRESSED OR HOPELESS: 0
SUM OF ALL RESPONSES TO PHQ QUESTIONS 1-9: 0
SUM OF ALL RESPONSES TO PHQ QUESTIONS 1-9: 0
SUM OF ALL RESPONSES TO PHQ9 QUESTIONS 1 & 2: 0

## 2023-03-07 ASSESSMENT — ENCOUNTER SYMPTOMS
GASTROINTESTINAL NEGATIVE: 1
EYES NEGATIVE: 1
RESPIRATORY NEGATIVE: 1

## 2023-03-07 NOTE — PROGRESS NOTES
Visit Information    Have you changed or started any medications since your last visit including any over-the-counter medicines, vitamins, or herbal medicines? no   Are you having any side effects from any of your medications? -  no  Have you stopped taking any of your medications? Is so, why? -  no    Have you seen any other physician or provider since your last visit? No  Have you had any other diagnostic tests since your last visit? No  Have you been seen in the emergency room and/or had an admission to a hospital since we last saw you? No  Have you had your routine dental cleaning in the past 6 months? no    Have you activated your [employer] account? If not, what are your barriers?  Yes     Patient Care Team:  Jeffrey Andersen MD as PCP - General (Internal Medicine)    Medical History Review  Past Medical, Family, and Social History reviewed and does not contribute to the patient presenting condition    Health Maintenance   Topic Date Due    COVID-19 Vaccine (1) Never done    Depression Screen  Never done    Diabetic retinal exam  11/01/2016    Shingles vaccine (2 of 2) 05/04/2018    Diabetic Alb to Cr ratio (uACR) test  02/12/2019    Lipids  02/12/2019    GFR test (Diabetes, CKD 3-4, OR last GFR 15-59)  02/12/2019    Diabetic foot exam  03/07/2019    A1C test (Diabetic or Prediabetic)  07/25/2019    Breast cancer screen  04/04/2020    Flu vaccine (1) Never done    DTaP/Tdap/Td vaccine (2 - Td or Tdap) 02/01/2026    Colorectal Cancer Screen  07/05/2026    Pneumococcal 0-64 years Vaccine  Completed    Hepatitis C screen  Completed    HIV screen  Completed    Hepatitis A vaccine  Aged Out    Hib vaccine  Aged Out    Meningococcal (ACWY) vaccine  Aged Out

## 2023-03-07 NOTE — PROGRESS NOTES
[address] [address]  Dept: [phone]  Dept Fax: [phone]    Lilian Vasquez is a 58 y.o. female who presents today for her medicalconditions/complaints as noted below. Lilian Vasquez is c/o of Annual Exam (New patient )      HPI:     Diabetes  She presents for her initial diabetic visit. She has type 2 diabetes mellitus. Diabetic complications include peripheral neuropathy. Current diabetic treatment includes oral agent (triple therapy). She is compliant with treatment most of the time. An ACE inhibitor/angiotensin II receptor blocker is being taken. Hypertension  This is a chronic problem. The current episode started more than 1 year ago. The problem is unchanged. The problem is controlled. Risk factors for coronary artery disease include diabetes mellitus and dyslipidemia. Past treatments include ACE inhibitors. The current treatment provides significant improvement. Hyperlipidemia  This is a chronic problem. The current episode started more than 1 year ago. The problem is controlled. Current antihyperlipidemic treatment includes statins. The current treatment provides moderate improvement of lipids. Risk factors for coronary artery disease include diabetes mellitus, dyslipidemia and hypertension.      Past Medical History:   Diagnosis Date    Diabetic neuropathy ([facility] [address]     hands feet    Fibroid (bleeding) (uterine)     Glaucoma     right    Headache(784.0)     History of anemia     Hx of blood clots 2005    left leg, states caused from uterine fibroid tumor    Hyperlipidemia     Increased intraocular pressure 2008    right eye, needs to follow with Ophthalmology regularly    Retinal detachment     RIGHT    Type II or unspecified type diabetes mellitus without mention of complication, not stated as uncontrolled         Current Outpatient Medications   Medication Sig Dispense Refill    glucose monitoring (FREESTYLE FREEDOM) kit 1 kit by Does not apply route daily 1 kit 0    blood glucose monitor strips Test__1_times daily Diagnosis: 250.0   Diabetes Mellitus____Insulin Dependent__x__Non-Insulin Dependent 100 strip 5    Lancet Device MISC Use__1__times daily Diagnosis:250.0   Diabetes Mellitus_____Insulin Dependent___x__Non-Insulin Dependent 1 each 0    Lancets MISC Use daily. 100 each 3    Alcohol Swabs (ALCOHOL PREP) 70 % PADS Use___1__times per day Diagnosis: 250.0   Diabetes ___Insulin-Dependent__x_Non-Insulin Dependent 100 each 3    SITagliptin (JANUVIA) 100 MG tablet take 1 tablet by mouth every morning BEFORE BREAKFAST 90 tablet 1    glimepiride (AMARYL) 2 MG tablet Take 1 tablet by mouth every morning (before breakfast) 90 tablet 1    lisinopril (PRINIVIL;ZESTRIL) 2.5 MG tablet Take 1 tablet by mouth daily 90 tablet 1    metFORMIN (GLUCOPHAGE) 500 MG tablet Take 1 tablet by mouth 2 times daily (with meals) 180 tablet 1    pravastatin (PRAVACHOL) 20 MG tablet Take 1 tablet by mouth daily 90 tablet 1    acetaminophen (APAP EXTRA STRENGTH) 500 MG tablet Take 2 tablets by mouth 3 times daily as needed for Pain 60 tablet 0     No current facility-administered medications for this visit.      Allergies   Allergen Reactions    Aspirin Hives    Ibuprofen        Health Maintenance   Topic Date Due    Depression Screen  Never done    Diabetic retinal exam  11/01/2016    Diabetic Alb to Cr ratio (uACR) test  02/12/2019    Lipids  02/12/2019    GFR test (Diabetes, CKD 3-4, OR last GFR 15-59)  02/12/2019    Diabetic foot exam  03/07/2019    A1C test (Diabetic or Prediabetic)  07/25/2019    Breast cancer screen  04/04/2020    COVID-19 Vaccine (4 - Booster for Moderna series) 03/21/2022    Flu vaccine (1) Never done    DTaP/Tdap/Td vaccine (2 - Td or Tdap) 02/01/2026    Colorectal Cancer Screen  07/05/2026    Shingles vaccine  Completed    Pneumococcal 0-64 years Vaccine  Completed    Hepatitis C screen  Completed    HIV screen  Completed    Hepatitis A vaccine Aged Out    Hib vaccine  Aged Out    Meningococcal (ACWY) vaccine  Aged Out       Subjective:      Review of Systems   Constitutional: Negative.    HENT: Negative.     Eyes: Negative.    Respiratory: Negative.     Cardiovascular: Negative.    Gastrointestinal: Negative.    Genitourinary: Negative.    Musculoskeletal: Negative.    Skin: Negative.    Neurological: Negative.    Psychiatric/Behavioral: Negative.     All other systems reviewed and are negative.    Objective:     Physical Exam  Vitals reviewed.   Constitutional:       Appearance: Normal appearance. She is well-developed.   HENT:      Head: Normocephalic and atraumatic.   Eyes:      Conjunctiva/sclera: Conjunctivae normal.      Pupils: Pupils are equal, round, and reactive to light.   Neck:      Thyroid: No thyromegaly.      Vascular: No JVD.   Cardiovascular:      Rate and Rhythm: Normal rate and regular rhythm.      Heart sounds: S1 normal and S2 normal. No murmur heard.  Pulmonary:      Effort: No respiratory distress.      Breath sounds: Normal breath sounds.   Abdominal:      General: Bowel sounds are normal.      Palpations: Abdomen is soft. There is no mass.      Tenderness: There is no abdominal tenderness.   Musculoskeletal:         General: No tenderness. Normal range of motion.      Cervical back: Neck supple.   Lymphadenopathy:      Cervical: No cervical adenopathy.   Skin:     General: Skin is warm and dry.   Neurological:      Mental Status: She is alert and oriented to person, place, and time.      Cranial Nerves: No cranial nerve deficit.      Deep Tendon Reflexes: Reflexes are normal and symmetric.   Psychiatric:         Behavior: Behavior normal.     /78 (Site: Right Upper Arm)   Ht 5' 6\" (1.676 m)   Wt 151 lb 3.2 oz (68.6 kg)   BMI 24.40 kg/m²       Assessment:       Diagnosis Orders   1. Type 2 diabetes mellitus with diabetic polyneuropathy, without long-term current use of insulin ([facility])  glucose monitoring (FREESTYLE FREEDOM)  kit    blood glucose monitor strips    Lancet Device MISC    Lancets MISC    Alcohol Swabs (ALCOHOL PREP) 70 % PADS    Hemoglobin A1C      2. Hypercholesteremia        3. Screening cholesterol level  Lipid Panel      4. Well adult exam  Comprehensive Metabolic Panel    CBC with Auto Differential    TSH with Reflex      5. Type 2 diabetes mellitus without complication, without long-term current use of insulin (HCC)  SITagliptin (JANUVIA) 100 MG tablet    metFORMIN (GLUCOPHAGE) 500 MG tablet      6. Type 2 diabetes mellitus with complication (HCC)  glimepiride (AMARYL) 2 MG tablet      7. Essential hypertension  lisinopril (PRINIVIL;ZESTRIL) 2.5 MG tablet      8. Dyslipidemia  pravastatin (PRAVACHOL) 20 MG tablet          Plan:      No follow-ups on file. Orders Placed This Encounter   Medications    glucose monitoring (FREESTYLE InsideView) kit     Si kit by Does not apply route daily     Dispense:  1 kit     Refill:  0    blood glucose monitor strips     Sig: Test__1_times daily Diagnosis: 250.0   Diabetes Mellitus____Insulin Dependent__x__Non-Insulin Dependent     Dispense:  100 strip     Refill:  5    Lancet Device MISC     Sig: Use__1__times daily Diagnosis:250.0   Diabetes Mellitus_____Insulin Dependent___x__Non-Insulin Dependent     Dispense:  1 each     Refill:  0    Lancets MISC     Sig: Use daily.      Dispense:  100 each     Refill:  3    Alcohol Swabs (ALCOHOL PREP) 70 % PADS     Sig: Use___1__times per day Diagnosis: 250.0   Diabetes ___Insulin-Dependent__x_Non-Insulin Dependent     Dispense:  100 each     Refill:  3    SITagliptin (JANUVIA) 100 MG tablet     Sig: take 1 tablet by mouth every morning BEFORE BREAKFAST     Dispense:  90 tablet     Refill:  1    glimepiride (AMARYL) 2 MG tablet     Sig: Take 1 tablet by mouth every morning (before breakfast)     Dispense:  90 tablet     Refill:  1    lisinopril (PRINIVIL;ZESTRIL) 2.5 MG tablet     Sig: Take 1 tablet by mouth daily     Dispense:  90 tablet Refill:  1    metFORMIN (GLUCOPHAGE) 500 MG tablet     Sig: Take 1 tablet by mouth 2 times daily (with meals)     Dispense:  180 tablet     Refill:  1    pravastatin (PRAVACHOL) 20 MG tablet     Sig: Take 1 tablet by mouth daily     Dispense:  90 tablet     Refill:  1     Orders Placed This Encounter   Procedures    Comprehensive Metabolic Panel     Standing Status:   Future     Standing Expiration Date:   3/7/2024    CBC with Auto Differential     Standing Status:   Future     Standing Expiration Date:   3/6/2024    Lipid Panel     Standing Status:   Future     Standing Expiration Date:   3/7/2024    TSH with Reflex     Standing Status:   Future     Standing Expiration Date:   3/7/2024    Hemoglobin A1C     Standing Status:   Future     Standing Expiration Date:   3/7/2024            Patient given educational materials - see patient instructions. Discussed use, benefit, and side effects of prescribed medications. All patientquestions answered. Pt voiced understanding.     Electronically signed by Agnieszka Davison MD on 3/7/2023at 3:25 PM

## 2023-03-22 ENCOUNTER — HOSPITAL ENCOUNTER (OUTPATIENT)
Age: 63
Setting detail: SPECIMEN
Discharge: HOME | End: 2023-03-22

## 2023-03-22 DIAGNOSIS — Z13.220 SCREENING CHOLESTEROL LEVEL: ICD-10-CM

## 2023-03-22 DIAGNOSIS — Z00.00 WELL ADULT EXAM: ICD-10-CM

## 2023-03-22 DIAGNOSIS — E11.42 TYPE 2 DIABETES MELLITUS WITH DIABETIC POLYNEUROPATHY, WITHOUT LONG-TERM CURRENT USE OF INSULIN (HCC): ICD-10-CM

## 2023-03-22 LAB
ABSOLUTE EOS #: 0.1 K/UL (ref 0–0.44)
ABSOLUTE IMMATURE GRANULOCYTE: <0.03 K/UL (ref 0–0.3)
ABSOLUTE LYMPH #: 2.08 K/UL (ref 1.1–3.7)
ABSOLUTE MONO #: 0.35 K/UL (ref 0.1–1.2)
ALBUMIN SERPL-MCNC: 4 G/DL (ref 3.5–5.2)
ALBUMIN/GLOBULIN RATIO: 1.4 (ref 1–2.5)
ALP SERPL-CCNC: 102 U/L (ref 35–104)
ALT SERPL-CCNC: 19 U/L (ref 5–33)
ANION GAP SERPL CALCULATED.3IONS-SCNC: 12 MMOL/L (ref 9–17)
AST SERPL-CCNC: 20 U/L
BASOPHILS # BLD: 1 % (ref 0–2)
BASOPHILS ABSOLUTE: 0.04 K/UL (ref 0–0.2)
BILIRUB SERPL-MCNC: 0.3 MG/DL (ref 0.3–1.2)
BUN SERPL-MCNC: 19 MG/DL (ref 8–23)
CALCIUM SERPL-MCNC: 9.7 MG/DL (ref 8.6–10.4)
CHLORIDE SERPL-SCNC: 108 MMOL/L (ref 98–107)
CHOLEST SERPL-MCNC: 132 MG/DL
CHOLESTEROL/HDL RATIO: 2.4
CO2 SERPL-SCNC: 24 MMOL/L (ref 20–31)
CREAT SERPL-MCNC: 0.65 MG/DL (ref 0.5–0.9)
EOSINOPHILS RELATIVE PERCENT: 2 % (ref 1–4)
GFR SERPL CREATININE-BSD FRML MDRD: >60 ML/MIN/1.73M2
GLUCOSE SERPL-MCNC: 100 MG/DL (ref 70–99)
HCT VFR BLD AUTO: 42.2 % (ref 36.3–47.1)
HDLC SERPL-MCNC: 54 MG/DL
HGB BLD-MCNC: 13.1 G/DL (ref 11.9–15.1)
IMMATURE GRANULOCYTES: 0 %
LDLC SERPL CALC-MCNC: 67 MG/DL (ref 0–130)
LYMPHOCYTES # BLD: 45 % (ref 24–43)
MCH RBC QN AUTO: 30 PG (ref 25.2–33.5)
MCHC RBC AUTO-ENTMCNC: 31 G/DL (ref 28.4–34.8)
MCV RBC AUTO: 96.8 FL (ref 82.6–102.9)
MONOCYTES # BLD: 8 % (ref 3–12)
NRBC AUTOMATED: 0 PER 100 WBC
PDW BLD-RTO: 14.3 % (ref 11.8–14.4)
PLATELET # BLD AUTO: 340 K/UL (ref 138–453)
PMV BLD AUTO: 10.9 FL (ref 8.1–13.5)
POTASSIUM SERPL-SCNC: 5 MMOL/L (ref 3.7–5.3)
PROT SERPL-MCNC: 6.9 G/DL (ref 6.4–8.3)
RBC # BLD: 4.36 M/UL (ref 3.95–5.11)
SEG NEUTROPHILS: 44 % (ref 36–65)
SEGMENTED NEUTROPHILS ABSOLUTE COUNT: 2 K/UL (ref 1.5–8.1)
SODIUM SERPL-SCNC: 144 MMOL/L (ref 135–144)
TRIGL SERPL-MCNC: 56 MG/DL
TSH SERPL-ACNC: 0.6 UIU/ML (ref 0.3–5)
WBC # BLD AUTO: 4.6 K/UL (ref 3.5–11.3)

## 2023-03-23 LAB
EST. AVERAGE GLUCOSE BLD GHB EST-MCNC: 186 MG/DL
HBA1C MFR BLD: 8.1 % (ref 4–6)

## 2023-04-10 NOTE — TELEPHONE ENCOUNTER
E-scribe request for GLIMEPIRIDE 2 MG TABLET, LISINOPRIL 2.5 MG TABLET, and PRAVASTATIN SODIUM 20 MG TAB. Please review and e-scribe if applicable. Last Visit Date:  4/20/17  Next Visit Date:  3/7/2018    Hemoglobin A1C (%)   Date Value   04/20/2017 6.9   02/01/2016 7.3   06/04/2015 6.9             ( goal A1C is < 7)   Microalb/Crt.  Ratio (mcg/mg creat)   Date Value   02/12/2018 CANNOT BE CALCULATED     LDL Cholesterol (mg/dL)   Date Value   02/12/2018 105       (goal LDL is <100)   AST (U/L)   Date Value   06/11/2015 15     ALT (U/L)   Date Value   06/11/2015 19     BUN (mg/dL)   Date Value   02/12/2018 14     BP Readings from Last 3 Encounters:   04/20/17 124/77   12/06/16 110/67   08/11/16 120/55          (goal 120/80)        Patient Active Problem List:     Diabetes mellitus ([facility] [address]     Dyslipidemia     Tension headache     Intraocular pressure increase     Retinal detachment of right eye with single break      ----JF
Labor Progress Note  Patient seen, fetal heart rate and contraction pattern evaluated, patient examined. Patient Vitals for the past 1 hrs:   BP Temp Pulse Resp SpO2   20 1538 -- -- -- -- 100 %   20 1533 -- -- -- -- 100 %   20 1530 (!) 129/101 -- (!) 112 -- --   20 1528 -- -- -- -- 100 %   20 1523 116/57 98.1 °F (36.7 °C) 94 16 100 %   20 1519 (!) 188/147 -- (!) 292 -- --   20 1518 -- -- -- -- 100 %   20 1517 (!) 171/135 -- (!) 102 -- --   20 1513 -- -- -- -- 100 %   20 1508 -- -- -- -- 100 %   20 1503 -- -- -- -- 99 %   20 1500 119/73 -- 88 -- --   20 1458 -- -- -- -- 100 %       Physical Exam:  Cervical Exam:  5/80 %/-2/   Membranes:  Artificial Rupture of Membranes; Amniotic Fluid: medium amount of clear fluid  Uterine Activity: Frequency: Every 2-5 minutes and Duration: 60 seconds  250-380 MVU in 10 mins  Fetal Heart Rate: Baseline: 130 per minute  Variability: moderate  Accelerations: no  Decelerations: variable and early  FSE in place    Assessment: IUP 37w2d; FHR Category II; IOL for CHTN  Plan: Close monitoring of maternal and fetal wellbeing, maternal repositioning, pitocin stopped and amnio infusion ordered, anticipate . Dr. Kenyatta Escobedo updated.   Kaye Rome CNM  2020  3:57 PM
negative

## 2023-07-20 ENCOUNTER — OFFICE VISIT (OUTPATIENT)
Dept: INTERNAL MEDICINE CLINIC | Age: 63
End: 2023-07-20
Payer: COMMERCIAL

## 2023-07-20 VITALS
DIASTOLIC BLOOD PRESSURE: 62 MMHG | SYSTOLIC BLOOD PRESSURE: 126 MMHG | BODY MASS INDEX: 23.73 KG/M2 | HEART RATE: 91 BPM | OXYGEN SATURATION: 98 % | WEIGHT: 147 LBS

## 2023-07-20 DIAGNOSIS — I10 ESSENTIAL HYPERTENSION: ICD-10-CM

## 2023-07-20 DIAGNOSIS — E11.42 TYPE 2 DIABETES MELLITUS WITH DIABETIC POLYNEUROPATHY, WITHOUT LONG-TERM CURRENT USE OF INSULIN (HCC): Primary | ICD-10-CM

## 2023-07-20 DIAGNOSIS — Z12.31 ENCOUNTER FOR SCREENING MAMMOGRAM FOR BREAST CANCER: ICD-10-CM

## 2023-07-20 PROCEDURE — 3052F HG A1C>EQUAL 8.0%<EQUAL 9.0%: CPT | Performed by: INTERNAL MEDICINE

## 2023-07-20 PROCEDURE — 99213 OFFICE O/P EST LOW 20 MIN: CPT | Performed by: INTERNAL MEDICINE

## 2023-07-20 PROCEDURE — 3074F SYST BP LT 130 MM HG: CPT | Performed by: INTERNAL MEDICINE

## 2023-07-20 PROCEDURE — 3078F DIAST BP <80 MM HG: CPT | Performed by: INTERNAL MEDICINE

## 2023-07-20 ASSESSMENT — ENCOUNTER SYMPTOMS
GASTROINTESTINAL NEGATIVE: 1
EYES NEGATIVE: 1
RESPIRATORY NEGATIVE: 1

## 2023-07-20 NOTE — PROGRESS NOTES
[address] [address]  Dept: [phone]  Dept Fax: [phone]    Pepe Jett is a 61 y.o. female who presents today for her medicalconditions/complaints as noted below. Pepe Jett is c/o of Diabetes      HPI:     Diabetes  She presents for her follow-up diabetic visit. She has type 2 diabetes mellitus. There are no hypoglycemic complications. There are no diabetic complications. Risk factors for coronary artery disease include diabetes mellitus and hypertension. Current diabetic treatment includes oral agent (triple therapy). Hypertension  This is a new problem. The current episode started more than 1 year ago. The problem is unchanged. The problem is controlled. Risk factors for coronary artery disease include diabetes mellitus. Past treatments include ACE inhibitors. The current treatment provides significant improvement. Past Medical History:   Diagnosis Date    Diabetic neuropathy ([address])     hands feet    Fibroid (bleeding) (uterine)     Glaucoma     right    Headache(784.0)     History of anemia     Hx of blood clots 2005    left leg, states caused from uterine fibroid tumor    Hyperlipidemia     Increased intraocular pressure 2008    right eye, needs to follow with Ophthalmology regularly    Retinal detachment     RIGHT    Type II or unspecified type diabetes mellitus without mention of complication, not stated as uncontrolled         Current Outpatient Medications   Medication Sig Dispense Refill    glucose monitoring (FREESTYLE FREEDOM) kit 1 kit by Does not apply route daily 1 kit 0    blood glucose monitor strips Test__1_times daily Diagnosis: 250.0   Diabetes Mellitus____Insulin Dependent__x__Non-Insulin Dependent 100 strip 5    Lancet Device MISC Use__1__times daily Diagnosis:250.0   Diabetes Mellitus_____Insulin Dependent___x__Non-Insulin Dependent 1 each 0    Lancets MISC Use daily.  100 each 3    Alcohol Swabs (ALCOHOL PREP)

## 2023-08-21 ENCOUNTER — HOSPITAL ENCOUNTER (OUTPATIENT)
Dept: WOMENS IMAGING | Age: 63
Discharge: HOME | End: 2023-08-23
Payer: COMMERCIAL

## 2023-08-21 DIAGNOSIS — Z12.31 ENCOUNTER FOR SCREENING MAMMOGRAM FOR BREAST CANCER: ICD-10-CM

## 2023-08-21 PROCEDURE — 77063 BREAST TOMOSYNTHESIS BI: CPT

## 2023-09-12 DIAGNOSIS — E11.9 TYPE 2 DIABETES MELLITUS WITHOUT COMPLICATION, WITHOUT LONG-TERM CURRENT USE OF INSULIN (HCC): ICD-10-CM

## 2023-09-17 DIAGNOSIS — I10 ESSENTIAL HYPERTENSION: ICD-10-CM

## 2023-09-17 DIAGNOSIS — E78.5 DYSLIPIDEMIA: ICD-10-CM

## 2023-09-18 RX ORDER — PRAVASTATIN SODIUM 20 MG
20 TABLET ORAL DAILY
Qty: 180 TABLET | OUTPATIENT
Start: 2023-09-18

## 2023-09-18 RX ORDER — LISINOPRIL 2.5 MG/1
2.5 TABLET ORAL DAILY
Qty: 180 TABLET | OUTPATIENT
Start: 2023-09-18

## 2023-09-27 DIAGNOSIS — I10 ESSENTIAL HYPERTENSION: ICD-10-CM

## 2023-09-27 DIAGNOSIS — E78.5 DYSLIPIDEMIA: ICD-10-CM

## 2023-09-27 DIAGNOSIS — E11.42 TYPE 2 DIABETES MELLITUS WITH DIABETIC POLYNEUROPATHY, WITHOUT LONG-TERM CURRENT USE OF INSULIN (HCC): ICD-10-CM

## 2023-09-27 RX ORDER — LISINOPRIL 2.5 MG/1
2.5 TABLET ORAL DAILY
Qty: 90 TABLET | Refills: 1 | OUTPATIENT
Start: 2023-09-27

## 2023-09-27 RX ORDER — PRAVASTATIN SODIUM 20 MG
20 TABLET ORAL DAILY
Qty: 90 TABLET | Refills: 1 | OUTPATIENT
Start: 2023-09-27

## 2023-09-28 RX ORDER — SYRING-NEEDL,DISP,INSUL,0.3 ML 30 GX5/16"
SYRINGE, EMPTY DISPOSABLE MISCELLANEOUS
Qty: 1 EACH | Refills: 0 | Status: SHIPPED | OUTPATIENT
Start: 2023-09-28

## 2023-09-28 RX ORDER — BLOOD-GLUCOSE METER
1 KIT MISCELLANEOUS DAILY
Qty: 1 KIT | Refills: 0 | OUTPATIENT
Start: 2023-09-28

## 2023-10-02 DIAGNOSIS — E11.9 TYPE 2 DIABETES MELLITUS WITHOUT COMPLICATION, WITHOUT LONG-TERM CURRENT USE OF INSULIN (HCC): ICD-10-CM

## 2023-10-03 DIAGNOSIS — E11.42 TYPE 2 DIABETES MELLITUS WITH DIABETIC POLYNEUROPATHY, WITHOUT LONG-TERM CURRENT USE OF INSULIN (HCC): ICD-10-CM

## 2023-10-03 DIAGNOSIS — E11.9 TYPE 2 DIABETES MELLITUS WITHOUT COMPLICATION, WITHOUT LONG-TERM CURRENT USE OF INSULIN (HCC): ICD-10-CM

## 2023-10-03 DIAGNOSIS — E78.5 DYSLIPIDEMIA: ICD-10-CM

## 2023-10-03 DIAGNOSIS — I10 ESSENTIAL HYPERTENSION: ICD-10-CM

## 2023-10-03 RX ORDER — GLUCOSAMINE HCL/CHONDROITIN SU 500-400 MG
CAPSULE ORAL
Qty: 100 STRIP | Refills: 5 | OUTPATIENT
Start: 2023-10-03

## 2023-10-03 RX ORDER — LISINOPRIL 2.5 MG/1
2.5 TABLET ORAL DAILY
Qty: 90 TABLET | Refills: 1 | OUTPATIENT
Start: 2023-10-03

## 2023-10-03 RX ORDER — LANCETS 30 GAUGE
EACH MISCELLANEOUS
Qty: 100 EACH | Refills: 3 | OUTPATIENT
Start: 2023-10-03

## 2023-10-03 RX ORDER — PRAVASTATIN SODIUM 20 MG
20 TABLET ORAL DAILY
Qty: 90 TABLET | Refills: 1 | OUTPATIENT
Start: 2023-10-03

## 2023-10-03 NOTE — TELEPHONE ENCOUNTER
LV 7/20/2023  NV 10/10/2023 New to Provider    Patient is going out of town until the 9th and is out of medication.

## 2023-10-04 RX ORDER — GLUCOSAMINE HCL/CHONDROITIN SU 500-400 MG
CAPSULE ORAL
Qty: 100 STRIP | Refills: 5 | Status: SHIPPED | OUTPATIENT
Start: 2023-10-04

## 2023-10-04 RX ORDER — PRAVASTATIN SODIUM 20 MG
20 TABLET ORAL DAILY
Qty: 90 TABLET | Refills: 1 | Status: SHIPPED | OUTPATIENT
Start: 2023-10-04

## 2023-10-04 RX ORDER — LANCETS 30 GAUGE
EACH MISCELLANEOUS
Qty: 100 EACH | Refills: 3 | Status: SHIPPED | OUTPATIENT
Start: 2023-10-04

## 2023-10-04 RX ORDER — LISINOPRIL 2.5 MG/1
2.5 TABLET ORAL DAILY
Qty: 90 TABLET | Refills: 1 | Status: SHIPPED | OUTPATIENT
Start: 2023-10-04

## 2023-10-10 ENCOUNTER — HOSPITAL ENCOUNTER (OUTPATIENT)
Age: 63
Setting detail: SPECIMEN
Discharge: HOME | End: 2023-10-10

## 2023-10-10 ENCOUNTER — OFFICE VISIT (OUTPATIENT)
Dept: INTERNAL MEDICINE CLINIC | Age: 63
End: 2023-10-10
Payer: COMMERCIAL

## 2023-10-10 VITALS
WEIGHT: 150.2 LBS | HEART RATE: 69 BPM | SYSTOLIC BLOOD PRESSURE: 124 MMHG | DIASTOLIC BLOOD PRESSURE: 78 MMHG | BODY MASS INDEX: 24.24 KG/M2 | OXYGEN SATURATION: 98 %

## 2023-10-10 DIAGNOSIS — L91.8 CUTANEOUS SKIN TAGS: ICD-10-CM

## 2023-10-10 DIAGNOSIS — E11.42 TYPE 2 DIABETES MELLITUS WITH DIABETIC POLYNEUROPATHY, WITHOUT LONG-TERM CURRENT USE OF INSULIN (HCC): ICD-10-CM

## 2023-10-10 DIAGNOSIS — E11.9 TYPE 2 DIABETES MELLITUS WITHOUT COMPLICATION, WITHOUT LONG-TERM CURRENT USE OF INSULIN (HCC): Primary | ICD-10-CM

## 2023-10-10 LAB
CREAT UR-MCNC: 120.5 MG/DL (ref 28–217)
EST. AVERAGE GLUCOSE BLD GHB EST-MCNC: 120 MG/DL
HBA1C MFR BLD: 5.8 % (ref 4–6)
MICROALBUMIN UR-MCNC: 15 MG/L
MICROALBUMIN/CREAT UR-RTO: 12 MCG/MG CREAT

## 2023-10-10 PROCEDURE — 3052F HG A1C>EQUAL 8.0%<EQUAL 9.0%: CPT | Performed by: INTERNAL MEDICINE

## 2023-10-10 PROCEDURE — 99214 OFFICE O/P EST MOD 30 MIN: CPT | Performed by: INTERNAL MEDICINE

## 2023-10-10 RX ORDER — METFORMIN HYDROCHLORIDE 500 MG/1
500 TABLET, EXTENDED RELEASE ORAL 2 TIMES DAILY
Qty: 180 TABLET | Refills: 1 | Status: SHIPPED | OUTPATIENT
Start: 2023-10-10

## 2023-10-10 SDOH — HEALTH STABILITY: PHYSICAL HEALTH: ON AVERAGE, HOW MANY MINUTES DO YOU ENGAGE IN EXERCISE AT THIS LEVEL?: 60 MIN

## 2023-10-10 SDOH — HEALTH STABILITY: PHYSICAL HEALTH: ON AVERAGE, HOW MANY DAYS PER WEEK DO YOU ENGAGE IN MODERATE TO STRENUOUS EXERCISE (LIKE A BRISK WALK)?: 6 DAYS

## 2023-10-10 NOTE — PROGRESS NOTES
2.7 02/12/2018    CHOLHDLRATIO 2.9 06/11/2015           REVIEW OF SYSTEMS (except Subjective (HPI))  GENERAL: No fevers / chills  RESPIRATORY: Negative for cough, wheezing or shortness of breath  CARDIOVASCULAR: Negative for chest pain or palpitations. GI: no nausea, vomiting, or diarrhea  NEURO: No history of headaches    Past Medical History:   Diagnosis Date    Diabetic neuropathy (HCC)     hands feet    Fibroid (bleeding) (uterine)     Glaucoma     right    Headache(784.0)     History of anemia     Hx of blood clots 2005    left leg, states caused from uterine fibroid tumor    Hyperlipidemia     Increased intraocular pressure 2008    right eye, needs to follow with Ophthalmology regularly    Retinal detachment     RIGHT    Type II or unspecified type diabetes mellitus without mention of complication, not stated as uncontrolled        SOCIAL HISTORY:  Social History     Socioeconomic History    Marital status:      Spouse name: Not on file    Number of children: Not on file    Years of education: Not on file    Highest education level: Not on file   Occupational History    Not on file   Tobacco Use    Smoking status: Never    Smokeless tobacco: Never    Tobacco comments:     never   Substance and Sexual Activity    Alcohol use: No     Alcohol/week: 0.0 standard drinks of alcohol    Drug use: No    Sexual activity: Never   Other Topics Concern    Not on file   Social History Narrative    Not on file     Social Determinants of Health     Financial Resource Strain: Low Risk  (3/7/2023)    Overall Financial Resource Strain (CARDIA)     Difficulty of Paying Living Expenses: Not hard at all   Food Insecurity: No Food Insecurity (3/7/2023)    Hunger Vital Sign     Worried About Running Out of Food in the Last Year: Never true     Ran Out of Food in the Last Year: Never true   Transportation Needs: Unknown (3/7/2023)    PRAPARE - Transportation     Lack of Transportation (Medical):  Not on file     Lack of

## 2023-10-11 ENCOUNTER — TELEPHONE (OUTPATIENT)
Dept: INTERNAL MEDICINE CLINIC | Age: 63
End: 2023-10-11

## 2023-10-11 DIAGNOSIS — E11.69 TYPE 2 DIABETES MELLITUS WITH OTHER SPECIFIED COMPLICATION, WITHOUT LONG-TERM CURRENT USE OF INSULIN (HCC): Primary | ICD-10-CM

## 2023-10-11 RX ORDER — GLIMEPIRIDE 1 MG/1
1 TABLET ORAL
Qty: 90 TABLET | Refills: 1 | Status: SHIPPED | OUTPATIENT
Start: 2023-10-11

## 2023-10-11 NOTE — TELEPHONE ENCOUNTER
Diabetes Control is excellent, hemoglobin A1c is 5.8. We can decrease dosing of glimepiride to 1 mg daily.     Continue current dietary and exercise regimen

## 2023-11-20 ENCOUNTER — HOSPITAL ENCOUNTER (OUTPATIENT)
Dept: PREADMISSION TESTING | Age: 63
Discharge: HOME | End: 2023-11-20

## 2023-11-22 ENCOUNTER — HOSPITAL ENCOUNTER (OUTPATIENT)
Dept: PREADMISSION TESTING | Age: 63
End: 2023-11-22
Payer: COMMERCIAL

## 2023-11-22 VITALS — BODY MASS INDEX: 24.11 KG/M2 | HEIGHT: 66 IN | WEIGHT: 150 LBS

## 2023-11-22 NOTE — PRE-PROCEDURE INSTRUCTIONS
No answer, left message ? yes                           Unable to leave message ? When were you told to arrive at hospital ?      Do you have a  ? Are you on any blood thinners ? If yes when did you stop taking ? Do you have your prep Rx filled and instruction ? Nothing to eat the day before , only clear liquids. Are you experiencing any covid symptoms ? Do you have any infections or rash we should be aware of ? Do you have the Hibiclens soap to use the night before and the morning of surgery ? Nothing to eat or drink after midnight, only a sip of water to take any medication instructed to take the night before.   Wear comfortable clothing, leave any valuables at home, remove any jewelry and body piercing   Reviewed arrival time to the hospital, she does not require a  as this is a local.

## 2023-11-22 NOTE — PROGRESS NOTES
Pre-op Instructions For Out-Patient Surgery    Medication Instructions:  Please stop herbs and any supplements now (includes vitamins and minerals). Please contact your surgeon and prescribing physician for pre-op instructions for any blood thinners. If you have inhalers/aerosol treatments at home, please use them the morning of your surgery and bring the inhalers with you to the hospital.    Please take the following medications the morning of your surgery with a sip of water: All morning meds, patient is not receiving anesthesia, it is a local    Surgery Instructions:    Please shower or bathe before surgery. Please do not wear any cologne, lotion, powder, jewelry, piercings, perfume, makeup, nail polish, hair accessories, or hair spray on the day of surgery. Wear loose comfortable clothing. Leave your valuables at home but bring a payment source for any after-surgery prescriptions you plan to fill at [facility]. Bring a storage case for any glasses/contacts. The Day of Surgery:  Arrive at [facility] AT [facility] Surgery Entrance at the time directed by your surgeon and check in at the desk. If you have a living will or healthcare power of , please bring a copy. You will be taken to the pre-op holding area where you will be prepared for surgery. A physical assessment will be performed by a nurse practitioner or house officer. Your IV will be started and you will meet your anesthesiologist.    When you go to surgery, your family will be directed to the surgical waiting room, where the doctor should speak with them after your surgery. After surgery, you will be taken to the recovery area. When you are alert and stable, you will receive instructions and be prepared for discharge. Instructions given to [facility], understanding verbalized and no questions at this time.

## 2023-11-24 ENCOUNTER — HOSPITAL ENCOUNTER (OUTPATIENT)
Age: 63
Setting detail: OUTPATIENT SURGERY
Discharge: HOME | End: 2023-11-24
Attending: SURGERY | Admitting: SURGERY
Payer: COMMERCIAL

## 2023-11-24 VITALS
HEIGHT: 66 IN | BODY MASS INDEX: 24.11 KG/M2 | WEIGHT: 150 LBS | HEART RATE: 66 BPM | DIASTOLIC BLOOD PRESSURE: 66 MMHG | RESPIRATION RATE: 16 BRPM | OXYGEN SATURATION: 98 % | TEMPERATURE: 98.2 F | SYSTOLIC BLOOD PRESSURE: 136 MMHG

## 2023-11-24 DIAGNOSIS — L98.9 SKIN LESION: ICD-10-CM

## 2023-11-24 LAB — GLUCOSE BLD-MCNC: 85 MG/DL (ref 65–105)

## 2023-11-24 PROCEDURE — 3600000002 HC SURGERY LEVEL 2 BASE: Performed by: SURGERY

## 2023-11-24 PROCEDURE — 3600000012 HC SURGERY LEVEL 2 ADDTL 15MIN: Performed by: SURGERY

## 2023-11-24 PROCEDURE — 2709999900 HC NON-CHARGEABLE SUPPLY: Performed by: SURGERY

## 2023-11-24 PROCEDURE — 6370000000 HC RX 637 (ALT 250 FOR IP): Performed by: SURGERY

## 2023-11-24 PROCEDURE — 2500000003 HC RX 250 WO HCPCS: Performed by: SURGERY

## 2023-11-24 PROCEDURE — 88305 TISSUE EXAM BY PATHOLOGIST: CPT

## 2023-11-24 PROCEDURE — 7100000011 HC PHASE II RECOVERY - ADDTL 15 MIN: Performed by: SURGERY

## 2023-11-24 PROCEDURE — 82947 ASSAY GLUCOSE BLOOD QUANT: CPT

## 2023-11-24 PROCEDURE — 7100000010 HC PHASE II RECOVERY - FIRST 15 MIN: Performed by: SURGERY

## 2023-11-24 RX ORDER — CEPHALEXIN 500 MG/1
CAPSULE ORAL
Qty: 21 CAPSULE | Refills: 0 | Status: SHIPPED | OUTPATIENT
Start: 2023-11-24

## 2023-11-24 RX ORDER — LIDOCAINE HYDROCHLORIDE 10 MG/ML
INJECTION, SOLUTION INFILTRATION; PERINEURAL PRN
Status: DISCONTINUED | OUTPATIENT
Start: 2023-11-24 | End: 2023-11-24 | Stop reason: ALTCHOICE

## 2023-11-24 RX ORDER — GINSENG 100 MG
CAPSULE ORAL PRN
Status: DISCONTINUED | OUTPATIENT
Start: 2023-11-24 | End: 2023-11-24 | Stop reason: ALTCHOICE

## 2023-11-24 ASSESSMENT — PAIN - FUNCTIONAL ASSESSMENT: PAIN_FUNCTIONAL_ASSESSMENT: 0-10

## 2023-11-24 NOTE — OP NOTE
Patient: Mabel Vaughan  YOB: 1960  MRN: [id number]    Date of Procedure: 11/24/2023        Preoperative diagnosis: Skin lesion right inner medial thigh    Postoperative diagnosis: Same    Procedure: Excision skin lesion right inner medial thigh 2 cm    Surgeon: Dr. Aj Bernabe    First assistant: Rosina Aj CNP    Anesthesia: Local    Preparation: Chloraprep    EBL: Less than 10 mL    Specimen: Skin lesion    Procedure: Informed consent was obtained. Site was marked and confirmed. Patient was taken to the operating room. She was lying in a supine position. Operative site was prepped and draped in usual sterile fashion. Timeout was done. Local anesthetic was infiltrated. Approximately 2 cm skin lesion was excised using the Bovie cautery on the right inner medial thigh. Specimen was submitted to pathology. Wound was explored. Hemostasis was confirmed. Antibiotic ointment was applied. Clean dressing was applied. Patient tolerated procedure well and was transferred to the recovery room in a stable condition. First assistant help with instrumentation and exposure and dressing.  -  Recommendations: Local wound care discussed. Outpatient follow-up in the office discussed. Prescription of Keflex called in.

## 2023-11-24 NOTE — PROGRESS NOTES
First Assist Operative Note      Patient: Joss Almanza  YOB: 1960  MRN: [id number]    Date of Procedure: 11/24/2023    Pre-Op Diagnosis Codes: Skin lesion right inner medial thigh    Post-Op Diagnosis: Same       Procedure(s): Excision skin lesion right inner medial thigh 2 cm    Surgeon(s):  Som Sky MD    Assistant: JAMIR Jefferson CNP was needed throughout the entirety of case for help with instrumentation and exposure and dressing. Anesthesia: Local    Detailed Description of Procedure: See Dr. Christopher Flores operative note from 11/24/2023 for further detail.     Electronically signed by JAMIR Jefferson CNP on 11/24/2023 at 1:17 PM
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
## 2023-11-27 LAB — DERMATOLOGY PATHOLOGY REPORT: NORMAL

## 2024-03-18 DIAGNOSIS — E11.9 TYPE 2 DIABETES MELLITUS WITHOUT COMPLICATION, WITHOUT LONG-TERM CURRENT USE OF INSULIN (HCC): ICD-10-CM

## 2024-04-05 DIAGNOSIS — I10 ESSENTIAL HYPERTENSION: ICD-10-CM

## 2024-04-05 RX ORDER — LISINOPRIL 2.5 MG/1
2.5 TABLET ORAL DAILY
Qty: 90 TABLET | Refills: 1 | Status: SHIPPED | OUTPATIENT
Start: 2024-04-05

## 2024-04-06 DIAGNOSIS — E78.5 DYSLIPIDEMIA: ICD-10-CM

## 2024-04-08 RX ORDER — PRAVASTATIN SODIUM 20 MG
20 TABLET ORAL DAILY
Qty: 90 TABLET | Refills: 1 | Status: SHIPPED | OUTPATIENT
Start: 2024-04-08

## 2024-04-08 RX ORDER — GLIMEPIRIDE 1 MG/1
1 TABLET ORAL
Qty: 90 TABLET | Refills: 1 | Status: SHIPPED | OUTPATIENT
Start: 2024-04-08

## 2024-04-09 DIAGNOSIS — E11.9 TYPE 2 DIABETES MELLITUS WITHOUT COMPLICATION, WITHOUT LONG-TERM CURRENT USE OF INSULIN (HCC): ICD-10-CM

## 2024-04-09 RX ORDER — METFORMIN HYDROCHLORIDE 500 MG/1
TABLET, EXTENDED RELEASE ORAL
Qty: 180 TABLET | Refills: 1 | OUTPATIENT
Start: 2024-04-09

## 2024-05-23 DIAGNOSIS — I10 ESSENTIAL HYPERTENSION: ICD-10-CM

## 2024-05-23 RX ORDER — LISINOPRIL 2.5 MG/1
2.5 TABLET ORAL DAILY
Qty: 90 TABLET | Refills: 1 | OUTPATIENT
Start: 2024-05-23

## 2024-06-02 ASSESSMENT — PATIENT HEALTH QUESTIONNAIRE - PHQ9
2. FEELING DOWN, DEPRESSED OR HOPELESS: SEVERAL DAYS
1. LITTLE INTEREST OR PLEASURE IN DOING THINGS: SEVERAL DAYS
1. LITTLE INTEREST OR PLEASURE IN DOING THINGS: SEVERAL DAYS
SUM OF ALL RESPONSES TO PHQ9 QUESTIONS 1 & 2: 2
SUM OF ALL RESPONSES TO PHQ9 QUESTIONS 1 & 2: 2
SUM OF ALL RESPONSES TO PHQ QUESTIONS 1-9: 2
2. FEELING DOWN, DEPRESSED OR HOPELESS: SEVERAL DAYS
SUM OF ALL RESPONSES TO PHQ QUESTIONS 1-9: 2

## 2024-06-04 SDOH — ECONOMIC STABILITY: FOOD INSECURITY: WITHIN THE PAST 12 MONTHS, THE FOOD YOU BOUGHT JUST DIDN'T LAST AND YOU DIDN'T HAVE MONEY TO GET MORE.: NEVER TRUE

## 2024-06-04 SDOH — ECONOMIC STABILITY: FOOD INSECURITY: WITHIN THE PAST 12 MONTHS, YOU WORRIED THAT YOUR FOOD WOULD RUN OUT BEFORE YOU GOT MONEY TO BUY MORE.: NEVER TRUE

## 2024-06-04 SDOH — ECONOMIC STABILITY: INCOME INSECURITY: HOW HARD IS IT FOR YOU TO PAY FOR THE VERY BASICS LIKE FOOD, HOUSING, MEDICAL CARE, AND HEATING?: NOT HARD AT ALL

## 2024-06-04 SDOH — ECONOMIC STABILITY: TRANSPORTATION INSECURITY
IN THE PAST 12 MONTHS, HAS LACK OF TRANSPORTATION KEPT YOU FROM MEETINGS, WORK, OR FROM GETTING THINGS NEEDED FOR DAILY LIVING?: NO

## 2024-06-05 ENCOUNTER — OFFICE VISIT (OUTPATIENT)
Dept: INTERNAL MEDICINE CLINIC | Age: 64
End: 2024-06-05
Payer: COMMERCIAL

## 2024-06-05 VITALS
BODY MASS INDEX: 24.11 KG/M2 | WEIGHT: 150 LBS | OXYGEN SATURATION: 97 % | HEIGHT: 66 IN | SYSTOLIC BLOOD PRESSURE: 106 MMHG | DIASTOLIC BLOOD PRESSURE: 66 MMHG | HEART RATE: 80 BPM

## 2024-06-05 DIAGNOSIS — E11.9 TYPE 2 DIABETES MELLITUS WITHOUT COMPLICATION, WITHOUT LONG-TERM CURRENT USE OF INSULIN (HCC): Primary | ICD-10-CM

## 2024-06-05 DIAGNOSIS — I10 ESSENTIAL HYPERTENSION: ICD-10-CM

## 2024-06-05 LAB — HBA1C MFR BLD: 6.6 %

## 2024-06-05 PROCEDURE — 3078F DIAST BP <80 MM HG: CPT | Performed by: INTERNAL MEDICINE

## 2024-06-05 PROCEDURE — 3044F HG A1C LEVEL LT 7.0%: CPT | Performed by: INTERNAL MEDICINE

## 2024-06-05 PROCEDURE — 99214 OFFICE O/P EST MOD 30 MIN: CPT | Performed by: INTERNAL MEDICINE

## 2024-06-05 PROCEDURE — 3074F SYST BP LT 130 MM HG: CPT | Performed by: INTERNAL MEDICINE

## 2024-06-05 PROCEDURE — 83036 HEMOGLOBIN GLYCOSYLATED A1C: CPT | Performed by: INTERNAL MEDICINE

## 2024-06-05 RX ORDER — LISINOPRIL 2.5 MG/1
2.5 TABLET ORAL DAILY
Qty: 90 TABLET | Refills: 1 | Status: SHIPPED | OUTPATIENT
Start: 2024-06-05

## 2024-06-05 RX ORDER — LATANOPROST 50 UG/ML
SOLUTION/ DROPS OPHTHALMIC
COMMUNITY
Start: 2024-05-23

## 2024-06-05 RX ORDER — TIMOLOL MALEATE 5 MG/ML
SOLUTION/ DROPS OPHTHALMIC
COMMUNITY
Start: 2024-05-23

## 2024-06-05 NOTE — PROGRESS NOTES
Visit Information    Have you changed or started any medications since your last visit including any over-the-counter medicines, vitamins, or herbal medicines? no   Are you having any side effects from any of your medications? -  no  Have you stopped taking any of your medications? Is so, why? -  no    Have you seen any other physician or provider since your last visit? No  Have you had any other diagnostic tests since your last visit? No  Have you been seen in the emergency room and/or had an admission to a hospital since we last saw you? No  Have you had your routine dental cleaning in the past 6 months? no    Have you activated your [employer] account? If not, what are your barriers? Yes     Patient Care Team:  Ashlyn Durbin MD as PCP - General (Internal Medicine)  Ashlyn Durbin MD as PCP - Empaneled Provider    Medical History Review  Past Medical, Family, and Social History reviewed and does contribute to the patient presenting condition    Health Maintenance   Topic Date Due    COVID-19 Vaccine (1) Never done    Pneumococcal 0-64 years Vaccine (2 of 2 - PCV) 09/03/2016    Diabetic retinal exam  11/01/2016    Diabetic foot exam  03/07/2019    Respiratory Syncytial Virus (RSV) Pregnant or age 60 yrs+ (1 - 1-dose 60+ series) Never done    Lipids  03/22/2024    GFR test (Diabetes, CKD 3-4, OR last GFR 15-59)  03/22/2024    Flu vaccine (Season Ended) 08/01/2024    A1C test (Diabetic or Prediabetic)  10/10/2024    Diabetic Alb to Cr ratio (uACR) test  10/10/2024    Depression Screen  06/02/2025    Breast cancer screen  08/21/2025    DTaP/Tdap/Td vaccine (2 - Td or Tdap) 02/01/2026    Colorectal Cancer Screen  07/05/2026    Shingles vaccine  Completed    Hepatitis C screen  Completed    HIV screen  Completed    Hepatitis A vaccine  Aged Out    Hepatitis B vaccine  Aged Out    Hib vaccine  Aged Out    Polio vaccine  Aged Out    Meningococcal (ACWY) vaccine  Aged Out     SUBJECTIVE:  Sydnee Hernandez is a 63 y.o.

## 2024-06-18 ENCOUNTER — HOSPITAL ENCOUNTER (OUTPATIENT)
Age: 64
Setting detail: SPECIMEN
Discharge: HOME | End: 2024-06-18

## 2024-06-18 DIAGNOSIS — E11.9 TYPE 2 DIABETES MELLITUS WITHOUT COMPLICATION, WITHOUT LONG-TERM CURRENT USE OF INSULIN (HCC): ICD-10-CM

## 2024-06-18 LAB
ALBUMIN SERPL-MCNC: 4.1 G/DL (ref 3.5–5.2)
ALBUMIN/GLOB SERPL: 2 {RATIO} (ref 1–2.5)
ALP SERPL-CCNC: 100 U/L (ref 35–104)
ALT SERPL-CCNC: 21 U/L (ref 10–35)
ANION GAP SERPL CALCULATED.3IONS-SCNC: 11 MMOL/L (ref 9–16)
AST SERPL-CCNC: 22 U/L (ref 10–35)
BILIRUB SERPL-MCNC: 0.4 MG/DL (ref 0–1.2)
BUN SERPL-MCNC: 16 MG/DL (ref 8–23)
CALCIUM SERPL-MCNC: 9.2 MG/DL (ref 8.6–10.4)
CHLORIDE SERPL-SCNC: 108 MMOL/L (ref 98–107)
CHOLEST SERPL-MCNC: 157 MG/DL (ref 0–199)
CHOLESTEROL/HDL RATIO: 3
CO2 SERPL-SCNC: 26 MMOL/L (ref 20–31)
CREAT SERPL-MCNC: 0.7 MG/DL (ref 0.5–0.9)
GFR, ESTIMATED: >90 ML/MIN/1.73M2
GLUCOSE SERPL-MCNC: 121 MG/DL (ref 74–99)
HDLC SERPL-MCNC: 49 MG/DL
LDLC SERPL CALC-MCNC: 85 MG/DL (ref 0–100)
POTASSIUM SERPL-SCNC: 4.6 MMOL/L (ref 3.7–5.3)
PROT SERPL-MCNC: 6.6 G/DL (ref 6.6–8.7)
SODIUM SERPL-SCNC: 145 MMOL/L (ref 136–145)
TRIGL SERPL-MCNC: 113 MG/DL (ref 0–149)
VLDLC SERPL CALC-MCNC: 23 MG/DL

## 2024-06-18 NOTE — RESULT ENCOUNTER NOTE
Cholesterol levels not at goal recommend increasing Pravachol to 40 mg daily.  Please let me know if patient agreeable and I shall order

## 2024-08-14 DIAGNOSIS — E11.9 TYPE 2 DIABETES MELLITUS WITHOUT COMPLICATION, WITHOUT LONG-TERM CURRENT USE OF INSULIN (HCC): ICD-10-CM

## 2024-08-14 RX ORDER — METFORMIN HYDROCHLORIDE 500 MG/1
500 TABLET, EXTENDED RELEASE ORAL 2 TIMES DAILY
Qty: 180 TABLET | Refills: 1 | Status: SHIPPED | OUTPATIENT
Start: 2024-08-14

## 2024-11-04 ENCOUNTER — OFFICE VISIT (OUTPATIENT)
Dept: INTERNAL MEDICINE CLINIC | Age: 64
End: 2024-11-04
Payer: COMMERCIAL

## 2024-11-04 ENCOUNTER — TELEPHONE (OUTPATIENT)
Dept: GASTROENTEROLOGY | Age: 64
End: 2024-11-04

## 2024-11-04 VITALS
DIASTOLIC BLOOD PRESSURE: 66 MMHG | HEIGHT: 66 IN | WEIGHT: 152 LBS | SYSTOLIC BLOOD PRESSURE: 112 MMHG | OXYGEN SATURATION: 98 % | BODY MASS INDEX: 24.43 KG/M2 | HEART RATE: 78 BPM

## 2024-11-04 DIAGNOSIS — I10 ESSENTIAL HYPERTENSION: ICD-10-CM

## 2024-11-04 DIAGNOSIS — Z12.11 COLON CANCER SCREENING: ICD-10-CM

## 2024-11-04 DIAGNOSIS — E11.9 TYPE 2 DIABETES MELLITUS WITHOUT COMPLICATION, WITHOUT LONG-TERM CURRENT USE OF INSULIN (HCC): Primary | ICD-10-CM

## 2024-11-04 DIAGNOSIS — E11.65 TYPE 2 DIABETES MELLITUS WITH HYPERGLYCEMIA, WITHOUT LONG-TERM CURRENT USE OF INSULIN (HCC): ICD-10-CM

## 2024-11-04 DIAGNOSIS — Z12.31 ENCOUNTER FOR SCREENING MAMMOGRAM FOR MALIGNANT NEOPLASM OF BREAST: ICD-10-CM

## 2024-11-04 LAB — HBA1C MFR BLD: 8.2 %

## 2024-11-04 PROCEDURE — 3078F DIAST BP <80 MM HG: CPT | Performed by: INTERNAL MEDICINE

## 2024-11-04 PROCEDURE — 3074F SYST BP LT 130 MM HG: CPT | Performed by: INTERNAL MEDICINE

## 2024-11-04 PROCEDURE — 99214 OFFICE O/P EST MOD 30 MIN: CPT | Performed by: INTERNAL MEDICINE

## 2024-11-04 PROCEDURE — 83036 HEMOGLOBIN GLYCOSYLATED A1C: CPT | Performed by: INTERNAL MEDICINE

## 2024-11-04 PROCEDURE — 3052F HG A1C>EQUAL 8.0%<EQUAL 9.0%: CPT | Performed by: INTERNAL MEDICINE

## 2024-11-04 RX ORDER — GLIMEPIRIDE 1 MG/1
1 TABLET ORAL EVERY MORNING
Qty: 90 TABLET | Refills: 1 | Status: SHIPPED | OUTPATIENT
Start: 2024-11-04

## 2024-11-04 RX ORDER — METFORMIN HYDROCHLORIDE 500 MG/1
500 TABLET, EXTENDED RELEASE ORAL 2 TIMES DAILY
Qty: 180 TABLET | Refills: 1 | Status: CANCELLED | OUTPATIENT
Start: 2024-11-04

## 2024-11-04 RX ORDER — LISINOPRIL 2.5 MG/1
2.5 TABLET ORAL DAILY
Qty: 90 TABLET | Refills: 1 | Status: SHIPPED | OUTPATIENT
Start: 2024-11-04

## 2024-11-04 RX ORDER — PRAVASTATIN SODIUM 40 MG
40 TABLET ORAL DAILY
Qty: 90 TABLET | Refills: 1 | Status: SHIPPED | OUTPATIENT
Start: 2024-11-04

## 2024-11-04 RX ORDER — METFORMIN HYDROCHLORIDE 500 MG/1
1000 TABLET, EXTENDED RELEASE ORAL 2 TIMES DAILY
Qty: 360 TABLET | Refills: 1 | Status: SHIPPED | OUTPATIENT
Start: 2024-11-04

## 2024-11-04 NOTE — PROGRESS NOTES
morning    Essential hypertension  -     lisinopril (PRINIVIL;ZESTRIL) 2.5 MG tablet; Take 1 tablet by mouth daily    Type 2 diabetes mellitus with hyperglycemia, without long-term current use of insulin (HCC)    Colon cancer screening  -     Kaveh Meyer MD, Gastroenter[facility], [place]    Encounter for screening mammogram for malignant neoplasm of breast  -     JANAY DIGITAL SCREEN W OR WO CAD BILATERAL; Future       Pt declined vaccines today      Follow up in: 3mth      Ashlyn Durbin MD

## 2024-11-08 ENCOUNTER — HOSPITAL ENCOUNTER (OUTPATIENT)
Age: 64
Setting detail: SPECIMEN
Discharge: HOME | End: 2024-11-08

## 2024-11-08 DIAGNOSIS — E11.9 TYPE 2 DIABETES MELLITUS WITHOUT COMPLICATION, WITHOUT LONG-TERM CURRENT USE OF INSULIN (HCC): ICD-10-CM

## 2024-11-08 LAB
CREAT UR-MCNC: 96.8 MG/DL (ref 28–217)
MICROALBUMIN UR-MCNC: 13 MG/L (ref 0–20)
MICROALBUMIN/CREAT UR-RTO: 13 MCG/MG CREAT (ref 0–25)

## 2024-12-26 DIAGNOSIS — E11.42 TYPE 2 DIABETES MELLITUS WITH DIABETIC POLYNEUROPATHY, WITHOUT LONG-TERM CURRENT USE OF INSULIN (HCC): ICD-10-CM

## 2024-12-26 RX ORDER — LANCETS 30 GAUGE
EACH MISCELLANEOUS
Qty: 100 EACH | Refills: 3 | Status: SHIPPED | OUTPATIENT
Start: 2024-12-26

## 2025-01-10 DIAGNOSIS — E11.9 TYPE 2 DIABETES MELLITUS WITHOUT COMPLICATION, WITHOUT LONG-TERM CURRENT USE OF INSULIN (HCC): ICD-10-CM

## 2025-03-11 ENCOUNTER — TELEPHONE (OUTPATIENT)
Dept: INTERNAL MEDICINE CLINIC | Age: 65
End: 2025-03-11

## 2025-03-11 NOTE — TELEPHONE ENCOUNTER
Attempted to contact patient to inform her that her appeal for Januvia was denied and that patient will have to either pay out of pocket for the medication, or have the provider prescribe something formulary to her insurance. Name on voicemail was not patient, no message left.

## 2025-04-16 ENCOUNTER — TELEPHONE (OUTPATIENT)
Dept: INTERNAL MEDICINE CLINIC | Age: 65
End: 2025-04-16

## 2025-04-16 NOTE — TELEPHONE ENCOUNTER
LM at number provided in patient's chart to have patient call office. Need to schedule a NTP appt and to ask if patient is still taking Januvia. Number on phone was not the same name as patient. Message only asked for patient to call.

## 2025-04-28 DIAGNOSIS — E11.9 TYPE 2 DIABETES MELLITUS WITHOUT COMPLICATION, WITHOUT LONG-TERM CURRENT USE OF INSULIN (HCC): ICD-10-CM

## 2025-04-28 DIAGNOSIS — I10 ESSENTIAL HYPERTENSION: ICD-10-CM

## 2025-04-28 RX ORDER — LISINOPRIL 2.5 MG/1
2.5 TABLET ORAL DAILY
Qty: 30 TABLET | Refills: 0 | Status: SHIPPED | OUTPATIENT
Start: 2025-04-28

## 2025-04-28 RX ORDER — PRAVASTATIN SODIUM 40 MG
40 TABLET ORAL DAILY
Qty: 30 TABLET | Refills: 0 | Status: SHIPPED | OUTPATIENT
Start: 2025-04-28

## 2025-06-13 DIAGNOSIS — E11.9 TYPE 2 DIABETES MELLITUS WITHOUT COMPLICATION, WITHOUT LONG-TERM CURRENT USE OF INSULIN (HCC): ICD-10-CM

## 2025-06-13 DIAGNOSIS — I10 ESSENTIAL HYPERTENSION: ICD-10-CM

## 2025-06-13 RX ORDER — PRAVASTATIN SODIUM 40 MG
40 TABLET ORAL DAILY
Qty: 60 TABLET | Refills: 0 | Status: SHIPPED | OUTPATIENT
Start: 2025-06-13

## 2025-06-13 RX ORDER — METFORMIN HYDROCHLORIDE 500 MG/1
1000 TABLET, EXTENDED RELEASE ORAL 2 TIMES DAILY
Qty: 240 TABLET | Refills: 0 | Status: SHIPPED | OUTPATIENT
Start: 2025-06-13

## 2025-06-13 RX ORDER — METFORMIN HYDROCHLORIDE 500 MG/1
1000 TABLET, EXTENDED RELEASE ORAL 2 TIMES DAILY
Qty: 360 TABLET | Refills: 1 | OUTPATIENT
Start: 2025-06-13

## 2025-06-13 RX ORDER — LISINOPRIL 2.5 MG/1
2.5 TABLET ORAL DAILY
Qty: 60 TABLET | Refills: 0 | Status: SHIPPED | OUTPATIENT
Start: 2025-06-13

## 2025-06-13 RX ORDER — PRAVASTATIN SODIUM 40 MG
40 TABLET ORAL DAILY
Qty: 30 TABLET | Refills: 0 | OUTPATIENT
Start: 2025-06-13

## 2025-06-13 RX ORDER — GLIMEPIRIDE 1 MG/1
1 TABLET ORAL EVERY MORNING
Qty: 90 TABLET | Refills: 1 | OUTPATIENT
Start: 2025-06-13

## 2025-06-13 RX ORDER — LISINOPRIL 2.5 MG/1
2.5 TABLET ORAL DAILY
Qty: 30 TABLET | Refills: 0 | OUTPATIENT
Start: 2025-06-13

## 2025-06-13 RX ORDER — GLIMEPIRIDE 1 MG/1
1 TABLET ORAL EVERY MORNING
Qty: 60 TABLET | Refills: 0 | Status: SHIPPED | OUTPATIENT
Start: 2025-06-13

## 2025-07-03 ENCOUNTER — TELEPHONE (OUTPATIENT)
Dept: INTERNAL MEDICINE CLINIC | Age: 65
End: 2025-07-03

## 2025-07-03 DIAGNOSIS — E11.42 TYPE 2 DIABETES MELLITUS WITH DIABETIC POLYNEUROPATHY, WITHOUT LONG-TERM CURRENT USE OF INSULIN (HCC): Primary | ICD-10-CM

## 2025-07-14 ENCOUNTER — HOSPITAL ENCOUNTER (OUTPATIENT)
Age: 65
Setting detail: SPECIMEN
Discharge: HOME | End: 2025-07-14

## 2025-07-14 ENCOUNTER — OFFICE VISIT (OUTPATIENT)
Dept: INTERNAL MEDICINE CLINIC | Age: 65
End: 2025-07-14
Payer: COMMERCIAL

## 2025-07-14 VITALS
SYSTOLIC BLOOD PRESSURE: 112 MMHG | DIASTOLIC BLOOD PRESSURE: 78 MMHG | OXYGEN SATURATION: 99 % | HEIGHT: 66 IN | HEART RATE: 114 BPM | WEIGHT: 152.4 LBS | BODY MASS INDEX: 24.49 KG/M2

## 2025-07-14 DIAGNOSIS — E11.9 TYPE 2 DIABETES MELLITUS WITHOUT COMPLICATION, WITHOUT LONG-TERM CURRENT USE OF INSULIN (HCC): ICD-10-CM

## 2025-07-14 DIAGNOSIS — I10 ESSENTIAL HYPERTENSION: ICD-10-CM

## 2025-07-14 DIAGNOSIS — R00.0 TACHYCARDIA: ICD-10-CM

## 2025-07-14 DIAGNOSIS — Z12.31 ENCOUNTER FOR SCREENING MAMMOGRAM FOR MALIGNANT NEOPLASM OF BREAST: ICD-10-CM

## 2025-07-14 DIAGNOSIS — R00.0 TACHYCARDIA: Primary | ICD-10-CM

## 2025-07-14 DIAGNOSIS — E78.5 DYSLIPIDEMIA: ICD-10-CM

## 2025-07-14 DIAGNOSIS — E11.42 TYPE 2 DIABETES MELLITUS WITH DIABETIC POLYNEUROPATHY, WITHOUT LONG-TERM CURRENT USE OF INSULIN (HCC): ICD-10-CM

## 2025-07-14 PROBLEM — E11.65 TYPE 2 DIABETES MELLITUS WITH HYPERGLYCEMIA (HCC): Status: RESOLVED | Noted: 2024-11-04 | Resolved: 2025-07-14

## 2025-07-14 LAB
ANION GAP SERPL CALCULATED.3IONS-SCNC: 13 MMOL/L (ref 9–16)
BUN SERPL-MCNC: 16 MG/DL (ref 8–23)
CALCIUM SERPL-MCNC: 9.3 MG/DL (ref 8.6–10.4)
CHLORIDE SERPL-SCNC: 105 MMOL/L (ref 98–107)
CO2 SERPL-SCNC: 22 MMOL/L (ref 20–31)
CREAT SERPL-MCNC: 0.4 MG/DL (ref 0.6–0.9)
GFR, ESTIMATED: >90 ML/MIN/1.73M2
GLUCOSE SERPL-MCNC: 266 MG/DL (ref 74–99)
HBA1C MFR BLD: 8.2 %
POTASSIUM SERPL-SCNC: 4.4 MMOL/L (ref 3.7–5.3)
SODIUM SERPL-SCNC: 140 MMOL/L (ref 136–145)
TSH SERPL DL<=0.05 MIU/L-ACNC: 1.01 UIU/ML (ref 0.27–4.2)

## 2025-07-14 PROCEDURE — 93000 ELECTROCARDIOGRAM COMPLETE: CPT | Performed by: STUDENT IN AN ORGANIZED HEALTH CARE EDUCATION/TRAINING PROGRAM

## 2025-07-14 PROCEDURE — 3074F SYST BP LT 130 MM HG: CPT | Performed by: STUDENT IN AN ORGANIZED HEALTH CARE EDUCATION/TRAINING PROGRAM

## 2025-07-14 PROCEDURE — 3078F DIAST BP <80 MM HG: CPT | Performed by: STUDENT IN AN ORGANIZED HEALTH CARE EDUCATION/TRAINING PROGRAM

## 2025-07-14 PROCEDURE — 99214 OFFICE O/P EST MOD 30 MIN: CPT | Performed by: STUDENT IN AN ORGANIZED HEALTH CARE EDUCATION/TRAINING PROGRAM

## 2025-07-14 PROCEDURE — 3052F HG A1C>EQUAL 8.0%<EQUAL 9.0%: CPT | Performed by: STUDENT IN AN ORGANIZED HEALTH CARE EDUCATION/TRAINING PROGRAM

## 2025-07-14 PROCEDURE — 83036 HEMOGLOBIN GLYCOSYLATED A1C: CPT | Performed by: STUDENT IN AN ORGANIZED HEALTH CARE EDUCATION/TRAINING PROGRAM

## 2025-07-14 RX ORDER — PRAVASTATIN SODIUM 40 MG
40 TABLET ORAL DAILY
Qty: 60 TABLET | Refills: 0 | Status: SHIPPED | OUTPATIENT
Start: 2025-07-14

## 2025-07-14 RX ORDER — LISINOPRIL 2.5 MG/1
2.5 TABLET ORAL DAILY
Qty: 60 TABLET | Refills: 0 | Status: SHIPPED | OUTPATIENT
Start: 2025-07-14

## 2025-07-14 RX ORDER — METFORMIN HYDROCHLORIDE 500 MG/1
1000 TABLET, EXTENDED RELEASE ORAL 2 TIMES DAILY
Qty: 240 TABLET | Refills: 0 | Status: SHIPPED | OUTPATIENT
Start: 2025-07-14

## 2025-07-14 RX ORDER — PIOGLITAZONE 15 MG/1
15 TABLET ORAL DAILY
Qty: 30 TABLET | Refills: 3 | Status: SHIPPED | OUTPATIENT
Start: 2025-07-14

## 2025-07-14 SDOH — HEALTH STABILITY: PHYSICAL HEALTH: ON AVERAGE, HOW MANY MINUTES DO YOU ENGAGE IN EXERCISE AT THIS LEVEL?: 150+ MIN

## 2025-07-14 SDOH — ECONOMIC STABILITY: FOOD INSECURITY: WITHIN THE PAST 12 MONTHS, THE FOOD YOU BOUGHT JUST DIDN'T LAST AND YOU DIDN'T HAVE MONEY TO GET MORE.: NEVER TRUE

## 2025-07-14 SDOH — ECONOMIC STABILITY: FOOD INSECURITY: WITHIN THE PAST 12 MONTHS, YOU WORRIED THAT YOUR FOOD WOULD RUN OUT BEFORE YOU GOT MONEY TO BUY MORE.: NEVER TRUE

## 2025-07-14 SDOH — HEALTH STABILITY: PHYSICAL HEALTH: ON AVERAGE, HOW MANY DAYS PER WEEK DO YOU ENGAGE IN MODERATE TO STRENUOUS EXERCISE (LIKE A BRISK WALK)?: 7 DAYS

## 2025-07-14 ASSESSMENT — ENCOUNTER SYMPTOMS
SORE THROAT: 0
SHORTNESS OF BREATH: 0
CONSTIPATION: 0
DIARRHEA: 0
COUGH: 0
ABDOMINAL PAIN: 0
CHEST TIGHTNESS: 0
SINUS PRESSURE: 0
WHEEZING: 0
BLOOD IN STOOL: 0
RHINORRHEA: 0
SINUS PAIN: 0
NAUSEA: 0

## 2025-07-14 ASSESSMENT — PATIENT HEALTH QUESTIONNAIRE - PHQ9
SUM OF ALL RESPONSES TO PHQ QUESTIONS 1-9: 0
2. FEELING DOWN, DEPRESSED OR HOPELESS: NOT AT ALL
1. LITTLE INTEREST OR PLEASURE IN DOING THINGS: NOT AT ALL
SUM OF ALL RESPONSES TO PHQ QUESTIONS 1-9: 0

## 2025-07-14 NOTE — PROGRESS NOTES
Have you been to the ER, urgent care clinic since your last visit?  Hospitalized since your last visit?   NO    Have you seen or consulted any other health care providers outside our system since your last visit?   NO      “Have you had a diabetic eye exam?”    NO     Date of last diabetic eye exam: 11/1/2015       
03/22/2023 09:20 AM     BMP:    Lab Results   Component Value Date/Time     06/18/2024 09:30 AM    K 4.6 06/18/2024 09:30 AM     06/18/2024 09:30 AM    CO2 26 06/18/2024 09:30 AM    BUN 16 06/18/2024 09:30 AM    CREATININE 0.7 06/18/2024 09:30 AM    GLUCOSE 121 06/18/2024 09:30 AM    GLUCOSE 132 04/12/2012 09:32 AM     HEMOGLOBIN A1C:   Lab Results   Component Value Date/Time    LABA1C 8.2 07/14/2025 03:34 PM     MICROALBUMIN URINE: No results found for: \"MICROALBUR\"  FASTING LIPID PANEL:  Lab Results   Component Value Date    CHOL 132 03/22/2023    HDL 49 06/18/2024    TRIG 56 03/22/2023     No results found for: \"LDLDIRECT\"    LIVER PROFILE:  Lab Results   Component Value Date/Time    ALT 21 06/18/2024 09:30 AM    AST 22 06/18/2024 09:30 AM    BILITOT 0.4 06/18/2024 09:30 AM      THYROID FUNCTION:   Lab Results   Component Value Date/Time    TSH 0.60 03/22/2023 09:20 AM      URINE ANALYSIS: No results found for: \"LABURIN\"  ASSESSMENT AND PLAN:    Sydnee was seen today for new patient.    Diagnoses and all orders for this visit:    Tachycardia  -     EKG 12 Lead; Future  -     TSH reflex to FT4; Future  -     Echo (TTE) complete (PRN contrast/bubble/strain/3D); Future  -     EKG 12 Lead    Type 2 diabetes mellitus with diabetic polyneuropathy, without long-term current use of insulin ([facility])  -     POCT glycosylated hemoglobin (Hb A1C)  -     metFORMIN (GLUCOPHAGE-XR) 500 MG extended release tablet; Take 2 tablets by mouth in the morning and at bedtime  -     pioglitazone (ACTOS) 15 MG tablet; Take 1 tablet by mouth daily    Type 2 diabetes mellitus without complication, without long-term current use of insulin ([facility])  -     lisinopril (PRINIVIL;ZESTRIL) 2.5 MG tablet; Take 1 tablet by mouth daily  -     metFORMIN (GLUCOPHAGE-XR) 500 MG extended release tablet; Take 2 tablets by mouth in the morning and at bedtime  -     pravastatin (PRAVACHOL) 40 MG tablet; Take 1 tablet by mouth daily    Essential

## 2025-07-22 NOTE — TELEPHONE ENCOUNTER
Received message from [employer] asking if patient can use a formulary medication? Either Saxagliptin or Zituvio.    Please advise and if so, please advise to dose and direction for pend.

## 2025-07-23 RX ORDER — SAXAGLIPTIN 2.5 MG/1
2.5 TABLET, FILM COATED ORAL DAILY
Qty: 90 TABLET | Refills: 0 | Status: SHIPPED | OUTPATIENT
Start: 2025-07-23

## 2025-07-30 ENCOUNTER — HOSPITAL ENCOUNTER (OUTPATIENT)
Age: 65
Discharge: HOME | End: 2025-08-01
Attending: STUDENT IN AN ORGANIZED HEALTH CARE EDUCATION/TRAINING PROGRAM
Payer: COMMERCIAL

## 2025-07-30 ENCOUNTER — HOSPITAL ENCOUNTER (OUTPATIENT)
Dept: WOMENS IMAGING | Age: 65
Discharge: HOME | End: 2025-08-01
Attending: STUDENT IN AN ORGANIZED HEALTH CARE EDUCATION/TRAINING PROGRAM
Payer: COMMERCIAL

## 2025-07-30 VITALS
WEIGHT: 152 LBS | BODY MASS INDEX: 24.43 KG/M2 | SYSTOLIC BLOOD PRESSURE: 104 MMHG | HEIGHT: 66 IN | HEART RATE: 97 BPM | DIASTOLIC BLOOD PRESSURE: 70 MMHG

## 2025-07-30 DIAGNOSIS — R00.0 TACHYCARDIA: ICD-10-CM

## 2025-07-30 DIAGNOSIS — Z12.31 ENCOUNTER FOR SCREENING MAMMOGRAM FOR MALIGNANT NEOPLASM OF BREAST: ICD-10-CM

## 2025-07-30 LAB
ECHO AO ASC DIAM: 2.5 CM
ECHO AO ASCENDING AORTA INDEX: 1.4 CM/M2
ECHO AO ROOT DIAM: 2.9 CM
ECHO AO ROOT INDEX: 1.63 CM/M2
ECHO AV AREA PEAK VELOCITY: 3 CM2
ECHO AV AREA VTI: 2.7 CM2
ECHO AV AREA/BSA PEAK VELOCITY: 1.7 CM2/M2
ECHO AV AREA/BSA VTI: 1.5 CM2/M2
ECHO AV MEAN GRADIENT: 3 MMHG
ECHO AV MEAN VELOCITY: 0.9 M/S
ECHO AV PEAK GRADIENT: 5 MMHG
ECHO AV PEAK VELOCITY: 1.2 M/S
ECHO AV VELOCITY RATIO: 0.92
ECHO AV VTI: 28.7 CM
ECHO BSA: 1.79 M2
ECHO EST RA PRESSURE: 3 MMHG
ECHO LA AREA 2C: 19.5 CM2
ECHO LA AREA 4C: 16 CM2
ECHO LA DIAMETER INDEX: 1.85 CM/M2
ECHO LA DIAMETER: 3.3 CM
ECHO LA MAJOR AXIS: 5.1 CM
ECHO LA MINOR AXIS: 5.9 CM
ECHO LA TO AORTIC ROOT RATIO: 1.14
ECHO LA VOL BP: 48 ML (ref 22–52)
ECHO LA VOL MOD A2C: 52 ML (ref 22–52)
ECHO LA VOL MOD A4C: 40 ML (ref 22–52)
ECHO LA VOL/BSA BIPLANE: 27 ML/M2 (ref 16–34)
ECHO LA VOLUME INDEX MOD A2C: 29 ML/M2 (ref 16–34)
ECHO LA VOLUME INDEX MOD A4C: 22 ML/M2 (ref 16–34)
ECHO LV E' LATERAL VELOCITY: 8.05 CM/S
ECHO LV E' SEPTAL VELOCITY: 6.42 CM/S
ECHO LV EDV A2C: 67 ML
ECHO LV EDV A4C: 75 ML
ECHO LV EDV INDEX A4C: 42 ML/M2
ECHO LV EDV NDEX A2C: 38 ML/M2
ECHO LV EF PHYSICIAN: 60 %
ECHO LV EJECTION FRACTION A2C: 58 %
ECHO LV EJECTION FRACTION A4C: 58 %
ECHO LV EJECTION FRACTION BIPLANE: 59 % (ref 55–100)
ECHO LV ESV A2C: 28 ML
ECHO LV ESV A4C: 31 ML
ECHO LV ESV INDEX A2C: 16 ML/M2
ECHO LV ESV INDEX A4C: 17 ML/M2
ECHO LV FRACTIONAL SHORTENING: 35 % (ref 28–44)
ECHO LV INTERNAL DIMENSION DIASTOLE INDEX: 1.74 CM/M2
ECHO LV INTERNAL DIMENSION DIASTOLIC: 3.1 CM (ref 3.9–5.3)
ECHO LV INTERNAL DIMENSION SYSTOLIC INDEX: 1.12 CM/M2
ECHO LV INTERNAL DIMENSION SYSTOLIC: 2 CM
ECHO LV IVSD: 1.1 CM (ref 0.6–0.9)
ECHO LV MASS 2D: 99.7 G (ref 67–162)
ECHO LV MASS INDEX 2D: 56 G/M2 (ref 43–95)
ECHO LV POSTERIOR WALL DIASTOLIC: 1.1 CM (ref 0.6–0.9)
ECHO LV RELATIVE WALL THICKNESS RATIO: 0.71
ECHO LVOT AREA: 3.1 CM2
ECHO LVOT AV VTI INDEX: 0.85
ECHO LVOT DIAM: 2 CM
ECHO LVOT MEAN GRADIENT: 3 MMHG
ECHO LVOT PEAK GRADIENT: 5 MMHG
ECHO LVOT PEAK VELOCITY: 1.1 M/S
ECHO LVOT STROKE VOLUME INDEX: 43.2 ML/M2
ECHO LVOT SV: 76.9 ML
ECHO LVOT VTI: 24.5 CM
ECHO MV A VELOCITY: 1.05 M/S
ECHO MV AREA VTI: 2.5 CM2
ECHO MV E DECELERATION TIME (DT): 236 MS
ECHO MV E VELOCITY: 0.71 M/S
ECHO MV E/A RATIO: 0.68
ECHO MV E/E' LATERAL: 8.82
ECHO MV E/E' RATIO (AVERAGED): 9.94
ECHO MV E/E' SEPTAL: 11.06
ECHO MV LVOT VTI INDEX: 1.24
ECHO MV MAX VELOCITY: 1.1 M/S
ECHO MV MEAN GRADIENT: 2 MMHG
ECHO MV MEAN VELOCITY: 0.6 M/S
ECHO MV PEAK GRADIENT: 5 MMHG
ECHO MV VTI: 30.3 CM
ECHO RA AREA 4C: 11.1 CM2
ECHO RA END SYSTOLIC VOLUME APICAL 4 CHAMBER INDEX BSA: 14 ML/M2
ECHO RA VOLUME: 25 ML
ECHO RIGHT VENTRICULAR SYSTOLIC PRESSURE (RVSP): 22 MMHG
ECHO RV BASAL DIMENSION: 3.5 CM
ECHO RV TAPSE: 2.2 CM (ref 1.7–?)
ECHO TV REGURGITANT MAX VELOCITY: 2.17 M/S
ECHO TV REGURGITANT PEAK GRADIENT: 19 MMHG

## 2025-07-30 PROCEDURE — 77063 BREAST TOMOSYNTHESIS BI: CPT

## 2025-07-30 PROCEDURE — 93306 TTE W/DOPPLER COMPLETE: CPT

## 2025-08-29 ENCOUNTER — TELEPHONE (OUTPATIENT)
Dept: INTERNAL MEDICINE CLINIC | Age: 65
End: 2025-08-29

## (undated) DEVICE — DRESSING TRNSPAR W2XL2.75IN FLM SHT SEMIPERMEABLE WIND

## (undated) DEVICE — ST. CHARLES BASIC SET UP: Brand: MEDLINE INDUSTRIES, INC.

## (undated) DEVICE — SUTURE VCRL + SZ 3-0 L27IN ABSRB UD L26MM SH 1/2 CIR VCP416H

## (undated) DEVICE — PAD DRESSING TELFA OUCHLESS NONADH 3X8IN

## (undated) DEVICE — GLOVE ORTHO 7 1/2   MSG9475

## (undated) DEVICE — SUTURE MCRYL + SZ 4-0 L27IN ABSRB UD L19MM PS-2 3/8 CIR MCP426H

## (undated) DEVICE — SHEET, T, LAPAROTOMY, STERILE: Brand: MEDLINE

## (undated) DEVICE — SOLUTION IRRIG 1000ML 0.9% SOD CHL USP POUR PLAS BTL

## (undated) DEVICE — SUTURE ETHLN SZ 4-0 L18IN NONABSORBABLE BLK L19MM PS-2 3/8 1667H